# Patient Record
Sex: FEMALE | Race: WHITE | Employment: OTHER | URBAN - METROPOLITAN AREA
[De-identification: names, ages, dates, MRNs, and addresses within clinical notes are randomized per-mention and may not be internally consistent; named-entity substitution may affect disease eponyms.]

---

## 2017-01-18 DIAGNOSIS — M15.9 PRIMARY OSTEOARTHRITIS INVOLVING MULTIPLE JOINTS: ICD-10-CM

## 2017-01-19 RX ORDER — MELOXICAM 15 MG/1
TABLET ORAL
Qty: 30 TABLET | Refills: 3 | Status: ON HOLD | OUTPATIENT
Start: 2017-01-19 | End: 2017-12-13 | Stop reason: HOSPADM

## 2017-03-02 ENCOUNTER — TELEPHONE (OUTPATIENT)
Dept: ORTHOPEDIC SURGERY | Age: 67
End: 2017-03-02

## 2017-03-16 ENCOUNTER — TELEPHONE (OUTPATIENT)
Dept: ENT CLINIC | Age: 67
End: 2017-03-16

## 2017-04-10 ENCOUNTER — NURSE ONLY (OUTPATIENT)
Dept: ENT CLINIC | Age: 67
End: 2017-04-10

## 2017-04-10 DIAGNOSIS — J32.9 SINUSITIS, UNSPECIFIED CHRONICITY, UNSPECIFIED LOCATION: Primary | ICD-10-CM

## 2017-04-10 PROCEDURE — 96372 THER/PROPH/DIAG INJ SC/IM: CPT | Performed by: OTOLARYNGOLOGY

## 2017-06-16 ENCOUNTER — OFFICE VISIT (OUTPATIENT)
Dept: ORTHOPEDIC SURGERY | Age: 67
End: 2017-06-16

## 2017-06-16 VITALS
HEART RATE: 77 BPM | SYSTOLIC BLOOD PRESSURE: 120 MMHG | WEIGHT: 186.95 LBS | HEIGHT: 64 IN | DIASTOLIC BLOOD PRESSURE: 73 MMHG | BODY MASS INDEX: 31.92 KG/M2

## 2017-06-16 DIAGNOSIS — M17.0 PRIMARY OSTEOARTHRITIS OF BOTH KNEES: Primary | ICD-10-CM

## 2017-06-16 PROCEDURE — 99213 OFFICE O/P EST LOW 20 MIN: CPT | Performed by: ORTHOPAEDIC SURGERY

## 2017-06-16 PROCEDURE — 20610 DRAIN/INJ JOINT/BURSA W/O US: CPT | Performed by: ORTHOPAEDIC SURGERY

## 2017-07-11 ENCOUNTER — OFFICE VISIT (OUTPATIENT)
Dept: ORTHOPEDIC SURGERY | Age: 67
End: 2017-07-11

## 2017-07-11 VITALS
WEIGHT: 187 LBS | RESPIRATION RATE: 16 BRPM | HEART RATE: 75 BPM | BODY MASS INDEX: 31.92 KG/M2 | HEIGHT: 64 IN | SYSTOLIC BLOOD PRESSURE: 148 MMHG | DIASTOLIC BLOOD PRESSURE: 81 MMHG

## 2017-07-11 DIAGNOSIS — M25.561 PAIN IN BOTH KNEES, UNSPECIFIED CHRONICITY: Primary | ICD-10-CM

## 2017-07-11 DIAGNOSIS — M17.0 PRIMARY OSTEOARTHRITIS OF BOTH KNEES: ICD-10-CM

## 2017-07-11 DIAGNOSIS — M25.562 PAIN IN BOTH KNEES, UNSPECIFIED CHRONICITY: Primary | ICD-10-CM

## 2017-07-11 PROCEDURE — 73564 X-RAY EXAM KNEE 4 OR MORE: CPT | Performed by: ORTHOPAEDIC SURGERY

## 2017-07-11 PROCEDURE — 99214 OFFICE O/P EST MOD 30 MIN: CPT | Performed by: ORTHOPAEDIC SURGERY

## 2017-07-12 ENCOUNTER — HOSPITAL ENCOUNTER (OUTPATIENT)
Dept: PHYSICAL THERAPY | Age: 67
Discharge: HOME | End: 2017-07-31
Admitting: ORTHOPAEDIC SURGERY

## 2017-07-12 ENCOUNTER — TELEPHONE (OUTPATIENT)
Dept: ORTHOPEDIC SURGERY | Age: 67
End: 2017-07-12

## 2017-07-12 ASSESSMENT — PAIN SCALES - GENERAL: PAINLEVEL_OUTOF10: 9

## 2017-07-13 ENCOUNTER — TELEPHONE (OUTPATIENT)
Dept: ORTHOPEDIC SURGERY | Age: 67
End: 2017-07-13

## 2017-08-14 ENCOUNTER — HOSPITAL ENCOUNTER (OUTPATIENT)
Dept: MRI IMAGING | Age: 67
Discharge: HOME | End: 2017-08-14
Attending: ORTHOPAEDIC SURGERY | Admitting: ORTHOPAEDIC SURGERY

## 2017-08-14 DIAGNOSIS — M17.0 PRIMARY OSTEOARTHRITIS OF BOTH KNEES: ICD-10-CM

## 2017-08-16 ENCOUNTER — HOSPITAL ENCOUNTER (OUTPATIENT)
Dept: OTHER | Age: 67
Discharge: HOME | End: 2017-08-16
Attending: ORTHOPAEDIC SURGERY | Admitting: ORTHOPAEDIC SURGERY

## 2017-08-16 DIAGNOSIS — M17.0 BILATERAL PRIMARY OSTEOARTHRITIS OF KNEE: ICD-10-CM

## 2017-08-18 ENCOUNTER — TELEPHONE (OUTPATIENT)
Dept: ORTHOPEDIC SURGERY | Age: 67
End: 2017-08-18

## 2017-08-24 ENCOUNTER — HOSPITAL ENCOUNTER (OUTPATIENT)
Dept: MAMMOGRAPHY | Age: 67
Discharge: HOME | End: 2017-08-24
Attending: SURGERY | Admitting: SURGERY

## 2017-08-24 DIAGNOSIS — Z12.31 VISIT FOR SCREENING MAMMOGRAM: ICD-10-CM

## 2017-09-01 ENCOUNTER — HOSPITAL ENCOUNTER (OUTPATIENT)
Dept: PREADMISSION TESTING | Age: 67
Discharge: HOME | End: 2017-09-01
Attending: ORTHOPAEDIC SURGERY | Admitting: ORTHOPAEDIC SURGERY

## 2017-09-01 VITALS — BODY MASS INDEX: 31.41 KG/M2 | HEIGHT: 64 IN | WEIGHT: 184 LBS

## 2017-09-01 LAB
A/G RATIO: 1.5 (ref 1.1–2.2)
ABO/RH: NORMAL
ALBUMIN SERPL-MCNC: 4.4 G/DL (ref 3.4–5)
ALP BLD-CCNC: 77 U/L (ref 40–129)
ALT SERPL-CCNC: 17 U/L (ref 10–40)
ANION GAP SERPL CALCULATED.3IONS-SCNC: 12 MMOL/L (ref 3–16)
ANTIBODY SCREEN: NORMAL
APTT: 27.6 SEC (ref 24.1–34.9)
AST SERPL-CCNC: 21 U/L (ref 15–37)
BASOPHILS ABSOLUTE: 0.1 K/UL (ref 0–0.2)
BASOPHILS RELATIVE PERCENT: 1 %
BILIRUB SERPL-MCNC: 0.3 MG/DL (ref 0–1)
BILIRUBIN URINE: NEGATIVE
BLOOD, URINE: NEGATIVE
BUN BLDV-MCNC: 21 MG/DL (ref 7–20)
CALCIUM SERPL-MCNC: 9.3 MG/DL (ref 8.3–10.6)
CHLORIDE BLD-SCNC: 102 MMOL/L (ref 99–110)
CLARITY: ABNORMAL
CO2: 27 MMOL/L (ref 21–32)
COLOR: ABNORMAL
COMMENT UA: NORMAL
CREAT SERPL-MCNC: 0.8 MG/DL (ref 0.6–1.2)
EKG ATRIAL RATE: 82 BPM
EKG DIAGNOSIS: NORMAL
EKG P AXIS: 47 DEGREES
EKG P-R INTERVAL: 148 MS
EKG Q-T INTERVAL: 350 MS
EKG QRS DURATION: 88 MS
EKG QTC CALCULATION (BAZETT): 408 MS
EKG R AXIS: 21 DEGREES
EKG T AXIS: 51 DEGREES
EKG VENTRICULAR RATE: 82 BPM
EOSINOPHILS ABSOLUTE: 0.3 K/UL (ref 0–0.6)
EOSINOPHILS RELATIVE PERCENT: 3.7 %
EPITHELIAL CELLS, UA: 3 /HPF (ref 0–5)
GFR AFRICAN AMERICAN: >60
GFR NON-AFRICAN AMERICAN: >60
GLOBULIN: 3 G/DL
GLUCOSE BLD-MCNC: 164 MG/DL (ref 70–99)
GLUCOSE URINE: NEGATIVE MG/DL
HCT VFR BLD CALC: 40.9 % (ref 36–48)
HEMOGLOBIN: 13.5 G/DL (ref 12–16)
HYALINE CASTS: 5 /LPF (ref 0–8)
INR BLD: 0.91 (ref 0.85–1.15)
KETONES, URINE: NEGATIVE MG/DL
LEUKOCYTE ESTERASE, URINE: NEGATIVE
LYMPHOCYTES ABSOLUTE: 2.3 K/UL (ref 1–5.1)
LYMPHOCYTES RELATIVE PERCENT: 30.5 %
MCH RBC QN AUTO: 30.3 PG (ref 26–34)
MCHC RBC AUTO-ENTMCNC: 33 G/DL (ref 31–36)
MCV RBC AUTO: 92 FL (ref 80–100)
MICROSCOPIC EXAMINATION: YES
MONOCYTES ABSOLUTE: 0.5 K/UL (ref 0–1.3)
MONOCYTES RELATIVE PERCENT: 6.2 %
NEUTROPHILS ABSOLUTE: 4.3 K/UL (ref 1.7–7.7)
NEUTROPHILS RELATIVE PERCENT: 58.6 %
NITRITE, URINE: NEGATIVE
PDW BLD-RTO: 12.8 % (ref 12.4–15.4)
PH UA: 6
PLATELET # BLD: 301 K/UL (ref 135–450)
PMV BLD AUTO: 8.3 FL (ref 5–10.5)
POTASSIUM SERPL-SCNC: 3.8 MMOL/L (ref 3.5–5.1)
PROTEIN UA: ABNORMAL MG/DL
PROTHROMBIN TIME: 10.3 SEC (ref 9.6–13)
RBC # BLD: 4.45 M/UL (ref 4–5.2)
RBC UA: 3 /HPF (ref 0–4)
SODIUM BLD-SCNC: 141 MMOL/L (ref 136–145)
SPECIFIC GRAVITY UA: 1.02
TOTAL PROTEIN: 7.4 G/DL (ref 6.4–8.2)
URINE TYPE: ABNORMAL
UROBILINOGEN, URINE: 0.2 E.U./DL
WBC # BLD: 7.4 K/UL (ref 4–11)
WBC UA: 2 /HPF (ref 0–5)

## 2017-09-01 PROCEDURE — 93010 ELECTROCARDIOGRAM REPORT: CPT | Performed by: INTERNAL MEDICINE

## 2017-09-01 RX ORDER — SODIUM CHLORIDE, SODIUM LACTATE, POTASSIUM CHLORIDE, CALCIUM CHLORIDE 600; 310; 30; 20 MG/100ML; MG/100ML; MG/100ML; MG/100ML
INJECTION, SOLUTION INTRAVENOUS CONTINUOUS
Status: CANCELLED | OUTPATIENT
Start: 2017-09-26

## 2017-09-01 RX ORDER — CEFAZOLIN SODIUM 2 G/100ML
2 INJECTION, SOLUTION INTRAVENOUS ONCE
Status: CANCELLED | OUTPATIENT
Start: 2017-09-26

## 2017-09-01 RX ORDER — LIDOCAINE HYDROCHLORIDE 10 MG/ML
0.5 INJECTION, SOLUTION EPIDURAL; INFILTRATION; INTRACAUDAL; PERINEURAL ONCE
Status: CANCELLED | OUTPATIENT
Start: 2017-09-26

## 2017-09-03 LAB
MRSA CULTURE ONLY: NORMAL
URINE CULTURE, ROUTINE: NORMAL
VITAMIN D 25-HYDROXY: 79.4 NG/ML

## 2017-09-06 ENCOUNTER — TELEPHONE (OUTPATIENT)
Dept: ORTHOPEDIC SURGERY | Age: 67
End: 2017-09-06

## 2017-09-06 RX ORDER — HYDROCODONE BITARTRATE AND ACETAMINOPHEN 5; 325 MG/1; MG/1
1 TABLET ORAL EVERY 6 HOURS PRN
Qty: 28 TABLET | Refills: 0 | Status: SHIPPED | OUTPATIENT
Start: 2017-09-06 | End: 2017-09-13

## 2017-09-26 PROBLEM — M17.12 OSTEOARTHRITIS OF LEFT KNEE: Status: ACTIVE | Noted: 2017-09-26

## 2017-09-28 ENCOUNTER — TELEPHONE (OUTPATIENT)
Dept: ORTHOPEDIC SURGERY | Age: 67
End: 2017-09-28

## 2017-10-02 ENCOUNTER — TELEPHONE (OUTPATIENT)
Dept: ORTHOPEDIC SURGERY | Age: 67
End: 2017-10-02

## 2017-10-09 ENCOUNTER — OFFICE VISIT (OUTPATIENT)
Dept: ORTHOPEDIC SURGERY | Age: 67
End: 2017-10-09

## 2017-10-09 VITALS
HEART RATE: 71 BPM | HEIGHT: 64 IN | BODY MASS INDEX: 31.58 KG/M2 | SYSTOLIC BLOOD PRESSURE: 124 MMHG | DIASTOLIC BLOOD PRESSURE: 64 MMHG | WEIGHT: 185 LBS

## 2017-10-09 DIAGNOSIS — M17.12 PRIMARY OSTEOARTHRITIS OF LEFT KNEE: Primary | ICD-10-CM

## 2017-10-09 PROCEDURE — 73562 X-RAY EXAM OF KNEE 3: CPT | Performed by: ORTHOPAEDIC SURGERY

## 2017-10-09 PROCEDURE — 99024 POSTOP FOLLOW-UP VISIT: CPT | Performed by: ORTHOPAEDIC SURGERY

## 2017-10-16 ENCOUNTER — HOSPITAL ENCOUNTER (OUTPATIENT)
Dept: PHYSICAL THERAPY | Age: 67
Discharge: HOME | End: 2017-10-31
Admitting: ORTHOPAEDIC SURGERY

## 2017-10-16 NOTE — PROGRESS NOTES
Encounter Date: 10/9/2017    Subjective:      Patient ID: Harlan Winkler is a 74 y.o. y.o. female. Chief Complaint   Patient presents with    Post-Op Check     fu for LT TKA, dos 9/26/17. doing well, no pain        Total Knee Follow-up  Harlan Winkler is here for 2 weeks post left total knee arthroplasty follow-up. Pain is controlled with current analgesics. Medication(s) being used: Hydrocodone. She denies  fever, wound drainage, increasing redness, pus, increasing pain, increasing swelling. Post op problems reported:  none    She is  working with home physical therapy and feels ready to transition to outpatient PT. DVT Prophylaxis completed. Exam:  /64   Pulse 71   Ht 5' 4\" (1.626 m)   Wt 185 lb (83.9 kg)   BMI 31.76 kg/m²   Gen: Alert and oriented x3, NAD and appropriately groomed. She is ambulating with her front wheeled walker but feels that she is ready to start transitioning to the cane. left knee incision well approximated, no erythema or drainage. Knee swelling and effusion resolving as expected. Range of motion from 3 to 105. No signs or symptoms of infection or DVT noted on exam.  Bilateral YESENIA stockings in place. X-rays: 3 views left knee Show stable total knee arthroplasty with satisfactory alignment. Assessment:     Stable status post left Total Knee Arthroplasty      Plan:     Orders Placed This Encounter   Procedures    XR KNEE LEFT (3 VIEWS)    [facility]        At this time, she will continue physical therapy and transition to outpatient PT next week. Follow up will be in 3 week(s) no x-rays will be needed unless there is a worsening change in symptoms. She understands and accepts this course of care.

## 2017-10-17 NOTE — FLOWSHEET NOTE
Physical Therapy Daily Treatment Note  Date:  10/17/2017    Patient Name:  Mary Anne Malcolm    :  1950  MRN: [id number]  Restrictions/Precautions:    Medical/Treatment Diagnosis Information:   · Diagnosis: Primary Osteoarthritis of both knees - M17.0 - PREHAB FOR L TKR - NOT SCHEDULED YET  · Treatment Diagnosis: knee pain, knee OA    Tracking Information:  Physician Information Referring Practitioner: Hever Call MD     Plan of Care Sent Date: 17 Signed Received:    Visit Count / Total Visits      Insurance Approved Visits  N/A Approved Dates:     Insurance Information PT Insurance Information: [employer] Medicare PPO - 96/4 bene - Vists per Med Necess     Progress Note/G-codes   [x]  Yes  []  No Next Due: RE-EVAL POST OP     Pain level: 3/10    Subjective:  SEE RE-EVALUATION    Objective:   Observation:   Test measurements:      Exercises:  Exercise/Equipment Resistance/Repetitions Other comments                                                                          Other Therapeutic Activities:  - Patient educated on the focus of skilled physical therapy services and plan of care, including: diagnosis, prognosis, treatment goals & options. Patient was thoroughly educated on this date regarding prehabilitation goals, importance of PT sessions in improving overall ROM, strength and stability prior to surgery, and how prehabilitation will facilitate improved post-operative outcomes. The patient was educated on and instructed in HEP as listed. The patient was given a detailed handout for exercises to initiate in the hospital post-operatively as well as at home. The discharge plan from the hospital was reviewed with the patient; specifically, to reduce length of hospital stay and to minimize time before reinitiating outpatient physical therapy after surgery.  Education regarding early mobility post-operatively in the hospital and emphasis on working with both physical therapy and nursing 50    Treatment/Activity Tolerance:  [x] Patient tolerated treatment well [] Patient limited by fatigue  [] Patient limited by pain  [] Patient limited by other medical complications  [] Other:     Prognosis: [x] Good [] Fair  [] Poor    Patient Requires Follow-up: [x] Yes  [] No    Plan:   [x] Continue per plan of care [] Alter current plan (see comments)  [] Plan of care initiated [] Hold pending MD visit [] Discharge    Plan for Next Session:  Continue Post operative PT    Electronically signed by:  Linda Calzada PT

## 2017-10-17 NOTE — PROGRESS NOTES
Outpatient Physical Therapy    Phone: [phone] Fax: [phone]    Physical Therapy Progress/Discharge Note  Date: 10/17/2017        Patient Name:  Jamaal Cagle    :  1950  MRN: [id number]  Restrictions/Precautions:      Medical/Treatment Diagnosis Information:  · Diagnosis: L TKA performed on 17   · Treatment Diagnosis: Decreased L Knee AROM, Strength and Balance/Proprioception  Insurance/Certification information:  PT Insurance Information: [employer] Medicare PPO - 96/4 bene - Vists per Med Necess  Physician Information:  Referring Practitioner: Shanika Guillory MD  Plan of care signed (Y/N): Y  Visit# / total: 3/10     G-Code (if applicable):      Date G-Code Applied:  10/17/2017  PT G-Codes  Functional Assessment Tool Used: LEFS   Score: 59/80  Functional Limitation: Mobility: Walking and moving around  Mobility: Walking and Moving Around Current Status (): At least 20 percent but less than 40 percent impaired, limited or restricted  Mobility: Walking and Moving Around Goal Status (): 0 percent impaired, limited or restricted     Time Period for Report:  17 - 10/16/17  Cancels/No-shows to date:  0    Plan of Care/Treatment to date:  [x] Therapeutic Exercise      [x] Modalities:  [x] Therapeutic Activity       [] Ultrasound    [x] Gait Training        [] Cervical Traction   [x] Neuromuscular Re-education      [x] Cold/hotpack    [x] Instruction in HEP        [] Lumbar Traction  [x] Manual Therapy        [x] Electrical Stimulation            [] Aquatic Therapy        [] Iontophoresis        ? [] Lymphedema management  [] Women's Health     Other:  [] Vestibular Rehab        []                     ? Significant Findings At Last Visit/Comments:    Subjective:  Subjective  Subjective: pt presents s/p L TKR performed on 17. presents ambulating with SPC. Had a shot of cortizone in her right knee during her left TKR.  Walking some around the house without assistive device. CLOF: States she is doing really well overall.      Objective:   AROM LLE (degrees)  L Knee Flexion 0-145: 100 degrees  L Knee Extension 0: lacking 2 degrees from full extension  AROM RLE (degrees)  R Knee Flexion 0-145: 121 degrees  R Knee Extension 0: 0 degrees          Functional testing Pre hab        Date 7/12/17 Re eval post op   10/16/17 4 week f/u   10/24/17 8 week f/u  Date TBD D/c                  TUG 5.18sec 9.3          30 second sit to stand 23 14          6 minute walk 531.8m 347.7m          Balance             Narrow EDWAR 10 10          Semi tandem 10 10          Tandem  10 10          SLS  6 2             Transfers  Comment: 30 Second Sit to Stand Test: 14 reps   Ambulation  Ambulation?: Yes  Ambulation 1  Comments: 6 Minute Walk Test: 347.7 meters   Balance  Comments: NBOS = 10sec, Staggered Tandem = 10sec, SLS: 2 seconds       Assessment:  Conditions Requiring Skilled Therapeutic Intervention  Body structures, Functions, Activity limitations: Decreased functional mobility , Decreased endurance, Decreased ROM, Decreased balance, Decreased strength  Assessment: Patient presents with increased pain, decreased strength, ROM, Function and will benefit from PT  Treatment Diagnosis: Decreased L Knee AROM, Strength and Balance/Proprioception  Prognosis: Good  Decision Making: Low Complexity  REQUIRES PT FOLLOW UP: Yes    Plan:   Plan  Times per week: 2-3 x   Times per day: Daily  Plan weeks: 4-6 weeks   Current Treatment Recommendations: Strengthening, ROM, Balance Training, Functional Mobility Training, Transfer Training, Gait Training, Stair training, Endurance Training, Safety Education & Training, Home Exercise Program, Patient/Caregiver Education & Training    Progress towards goals:    Long term goals  Time Frame for Long term goals : 8 weeks s/p  Long term goal 1: Patient will ambulate >500m in 6 minutes in order to return to PLOF  Long term goal 2: Patient will squat >60deg 20x w/o sxs/diff

## 2017-10-30 ENCOUNTER — OFFICE VISIT (OUTPATIENT)
Dept: ORTHOPEDIC SURGERY | Age: 67
End: 2017-10-30

## 2017-10-30 ENCOUNTER — TELEPHONE (OUTPATIENT)
Dept: ORTHOPEDIC SURGERY | Age: 67
End: 2017-10-30

## 2017-10-30 VITALS
HEIGHT: 64 IN | SYSTOLIC BLOOD PRESSURE: 126 MMHG | BODY MASS INDEX: 31.58 KG/M2 | HEART RATE: 78 BPM | WEIGHT: 185 LBS | DIASTOLIC BLOOD PRESSURE: 70 MMHG

## 2017-10-30 DIAGNOSIS — M17.11 PRIMARY OSTEOARTHRITIS OF RIGHT KNEE: Primary | ICD-10-CM

## 2017-10-30 DIAGNOSIS — M17.12 PRIMARY OSTEOARTHRITIS OF LEFT KNEE: ICD-10-CM

## 2017-10-30 PROCEDURE — 99024 POSTOP FOLLOW-UP VISIT: CPT | Performed by: ORTHOPAEDIC SURGERY

## 2017-10-30 PROCEDURE — 99213 OFFICE O/P EST LOW 20 MIN: CPT | Performed by: ORTHOPAEDIC SURGERY

## 2017-10-30 NOTE — LETTER
[facility]  Surgery Scheduling Form:  [facility]    DEMOGRAPHICS:                                                                                                              .    Patient Name:  Gianni Kilgore  Patient :  1950   Patient SS#:      Patient Phone:  [phone] (home) [phone] (other) [phone] (cell) Alt. Patient Phone:    Patient Address:  [address] [place], [place] [address]    PCP:  Adam Mora MD  Payor/Plan Subscr  Sex Relation Sub. Ins. ID Effective Group Num   1. Ravi Manjarrez* Lily Reyes 1950 Female Self [address] [id number]                                   [address]     DIAGNOSIS & PROCEDURE:                                                                                            .    Diagnosis:   Right knee osteoarthritis  Operation:  Right Total knee arthroplasty  Location:  [facility]   Provider:  Brenna Novoa. Catalina Canada M.D.    [facility] INFORMATION:                                                                                         .    Requested Date:  17    OR Time: 7:30                       Patient Arrival Time:  6:00  OR Time Required:  90 Minutes  Anesthesia:  General and Femoral Block  Equipment: Ochoa Robles C-Arm:  No   Standard C-Arm:  No  Status: SDA  PAT Required:  Yes  Comments: Allergies: Bupropion; Ezetimibe; Lisinopril; Statins; Welchol  [colesevelam hcl]; Wellbutrin [bupropion hcl];  Codeine; Diclofenac; and Other          10/30/17            BILLING INFORMATION:                                                                                                    .    Procedure:       CPT Code Modifier                      ORTHOPAEDIC SURGERY PRE-SURGICAL PHYSICIAN ORDERS  Gianni Kilgore  1950  Date of Surgery: 17  Right Total knee arthroplasty History & Physical:  [ ] Lakia Lopez   [ ] By Surgeon   By PCP Alonza.[email or web address] ]  Referrals:  [ ] Medical/Cardiac Clearance by _____________________________  [ ] Joint Replacement Class  Alonza.Eisenmenger ] Physical Therapy  [ ] Crutch Walking Instructions   Weight Bearing Status _____________  Alonza.Eisenmenger ] Occupational Therapy  [ ] Smoking Cessation Instructions  [ ] Other ________________________________________________    Pre Admission Testing:  [ ] Hemoglobin & Hematocrit      Alonza.Eisenmenger ] Comprehensive Metabolic Panel  Alonza.Eisenmenger ] CBC with differential              Alonza.Eisenmenger ] Type & Screen  [ ] CBC without differential       [ ] Type & Crossmatch 2 units-if total hip  Alonza.Eisenmenger ] PT/INR                                   [ ] Autologous Blood _____ units  Alonza.Eisenmenger ] PTT                                      Alonza.Eisenmenger ]  EKG  [ ] Urinalysis                               Alonza.Eisenmenger ]  25 Hydroxy Vitamin D  Alonza.Eisenmenger ] Urinalysis with C & S     [X] PT/INR   Alonza.Eisenmenger ] Basic Metabolic Panel         Alonza.Eisenmenger ] MRSA-nasal  Alonza.Eisenmenger  ] Other Anesthesia Guidelines [X] Hemoglobin A1C    Orders to be initiated upon admission to same day surgery:  Alonza.Eisenmenger ] Fasting blood sugar by fingerstick [ ] [place]  Alonza.Eisenmenger ] PT/INR (pt takes Warafin/Coumadin)     [ ] Interscalene Right or Left  Alonza.Eisenmenger ] HCG __X__ Urine _____ Serum              [X] Femoral   Right or Left  [ ] Other ______________________________________________    IV Fluids and Medications:  1. Place IV catherer for IV access. The RN may use 0.1ml of 1% Lidocaine SQ      Per site for IV starts up to maximum of 0.5ml.  2. Infuse Lactated Ringers IV fluid at 50ml per hour. For diabetic or has renal             impaired patient, infuse 0.9% Normal Saline at 50ml/hr. 3. Cefazolin 1g IV if <80kg OR 2g if 80-120kg OR 3g if >120kg, given within one hour of incision time. For those allergic to Cephalosporins, give Clindamycin 600mg IV within 1 hour of incision time.  If the pre-op nasal culture for MRSA/MSSA was positive, add Vancomycin 1 gram IVPB, reduce dose of Vancomycin to 500 mg IVPB if PT < 55 kg or serum creatinine > 2 mg/dl (Vancomycin must be administered over 1 hour).   4. Other medications: Celebrex 400mg pre-op    Additional Orders:  Cristal.Hilda ] Knee high anti-embolism stockings and antithrombic compression pumps (apply in Pre-op)                            Physican Signature:Date: 10/30/2017 Time: 3:04 PM

## 2017-11-01 ENCOUNTER — HOSPITAL ENCOUNTER (OUTPATIENT)
Dept: OTHER | Age: 67
Discharge: HOME | End: 2017-11-30
Attending: ORTHOPAEDIC SURGERY | Admitting: ORTHOPAEDIC SURGERY

## 2017-11-01 NOTE — FLOWSHEET NOTE
Physical Therapy Daily Treatment Note  Date:  2017    Patient Name:  Sonido Chow    :  1950  MRN: [id number]  Restrictions/Precautions:    Medical/Treatment Diagnosis Information:   · Diagnosis: Primary Osteoarthritis of both knees - M17.0 - PREHAB FOR L TKR - sx: 17  · Treatment Diagnosis: knee pain, knee OA    Tracking Information:  Physician Information Referring Practitioner: Eddie Kraft MD     Plan of Care Sent Date: 17 Signed Received: Yes   Visit Count / Total Visits      Insurance Approved Visits  N/A Approved Dates:     Insurance Information PT Insurance Information: [employer] Medicare PPO - 96/4 bene - Vists per Med Necess     Progress Note/G-codes   []  Yes  [x]  No Next Due: #10     Pain level: 0/10    Subjective:  Patient is now 5 weeks post-op. She has scheduled her other knee replacement for . She report she will travel again this weekend, but will be better prepared. Objective:   Observation: 10/30: Pt amb with min limp L LE.  10/25 - patient ambulates without SPC today  Test measurements: 10/30 - lack 2deg ext, 122deg flex     Exercises:  Exercise/Equipment Resistance/Repetitions Other comments        BikeBike 2, seat 6, L1, 5 min    TG Squats       Nu Step      IB/HR /TR     // Bars  Airex DLS tandem balance 2 x 30\" R/L Rockerboard Moguls - R/L 15x ML    SLS 4 x 10'' L    4'' step up/over Fwd/retro - 10x    Cables     Stairs  Up/down 6\" with B rails x4 reciprocal    Hams and knee flex stetch L/R 2x 30 sec ea Pt wants to work on stairs   Mat ex Prone knee flex L 10x  Supine with L heep propped quad set 10x     Ballet [place] 1/2 foam squats 4x  Standing squats 10x                           Other Therapeutic Activities:   10/25 - The patient was functionally tested today for 4 week s/p follow up.   - Patient educated on the focus of skilled physical therapy services and plan of care, including: diagnosis, prognosis, treatment goals & options. Patient was thoroughly educated on this date regarding prehabilitation goals, importance of PT sessions in improving overall ROM, strength and stability prior to surgery, and how prehabilitation will facilitate improved post-operative outcomes. The patient was educated on and instructed in HEP as listed. The patient was given a detailed handout for exercises to initiate in the hospital post-operatively as well as at home. The discharge plan from the hospital was reviewed with the patient; specifically, to reduce length of hospital stay and to minimize time before reinitiating outpatient physical therapy after surgery. Education regarding early mobility post-operatively in the hospital and emphasis on working with both physical therapy and nursing staff to achieve ambulation goal of 150 feet was provided. The patient was highly encouraged to attend joint class in hospital prior to surgery for further instructions on pre and post-surgical care. Also, education regarding goals and expectations was provided; specifically, knee flexion range of motion greater than or equal to 90 degrees and 0 degrees knee extension to promote improved gait mechanics and ambulation. The patient was encouraged to utilize ice/cold pack after surgery to address pain, minimize swelling as often as possible. It is in my medical opinion that this patient is clear from all physical barriers prior to consideration for surgery, activity modifications prior to and post operatively have been discussed with this patient as well as discharge planning and is cleared for surgery from physical therapy perspective.      Functional testing Pre hab        Date 7/12/17 Re eval post op 10/16/17 4 week f/u   Date 10/24 8 week f/u  Date 11/21 D/c            TUG 5.18sec 9.3 7.2     30 second sit to stand 23 14 17     6 minute walk 531.8m 347.7m 448.6m     Balance        Narrow EDWAR 10 10 10     Semi tandem 10 10 10     Tandem  10 10 10     SLS  6 2 10         Home

## 2017-11-02 ENCOUNTER — TELEPHONE (OUTPATIENT)
Dept: ORTHOPEDIC SURGERY | Age: 67
End: 2017-11-02

## 2017-11-03 ENCOUNTER — TELEPHONE (OUTPATIENT)
Dept: ORTHOPEDIC SURGERY | Age: 67
End: 2017-11-03

## 2017-11-03 ENCOUNTER — OFFICE VISIT (OUTPATIENT)
Dept: ORTHOPEDIC SURGERY | Age: 67
End: 2017-11-03

## 2017-11-03 VITALS — BODY MASS INDEX: 31.58 KG/M2 | WEIGHT: 185 LBS | HEIGHT: 64 IN

## 2017-11-03 DIAGNOSIS — M17.12 PRIMARY OSTEOARTHRITIS OF LEFT KNEE: ICD-10-CM

## 2017-11-03 PROCEDURE — L1812 KO ELASTIC W/JOINTS PRE OTS: HCPCS | Performed by: ORTHOPAEDIC SURGERY

## 2017-11-03 PROCEDURE — 99024 POSTOP FOLLOW-UP VISIT: CPT | Performed by: ORTHOPAEDIC SURGERY

## 2017-11-03 PROCEDURE — 73562 X-RAY EXAM OF KNEE 3: CPT | Performed by: ORTHOPAEDIC SURGERY

## 2017-11-04 NOTE — PROGRESS NOTES
Yomi Pan  [id number]  Encounter Date: 10/30/2017  YOB: 1950    Chief Complaint   Patient presents with    Post-Op Check     fu for LT TKA, dos 9/26/17. doing well. History:Ms. Yomi Pan is here in follow up regarding her left TKA and right knee arthritis. She feels she is making good progress with her left knee and is really having minimal pain. Currently rates her pain at 1/10. She is making good progress with physical therapy and would like to begin planning for her right total knee arthroplasty. She has severe osteoarthritic changes in that knee with bone on bone medial compartment and has failed conservative treatment. She is still using her Mobic as well as intermittent use of hydrocodone to control her pain. She ambulates with her cane at times mostly because of her right knee.     Past Medical History:   Diagnosis Date    Alopecia     Arthritis     right knee    Cancer ([facility] [address] 2008    breast(right)  mastectomy only on tamoxifen for 2 years    Degeneration of lumbar intervertebral disc     Depression     GERD (gastroesophageal reflux disease)     Hyperglycemia     Hyperlipidemia     Hyperlipidemia     Hypertension     Insomnia     lumbar spine     herniated disk low back/receives pain injections by Dr. Merrill Ogden Osteoarthritis of knees, bilateral     SBO (small bowel obstruction) 9/2/2015    Sjogren's disease ([facility] [address]      Past Surgical History:   Procedure Laterality Date    BREAST SURGERY  2008    right reconst    CARPAL TUNNEL RELEASE Right 2015    CHOLECYSTECTOMY      COLONOSCOPY      EYE SURGERY Bilateral 2005    lasix    FOOT SURGERY  11/29/11    bone spur removal in the 1st MTP joint and ganglion cyst removal in 2 MTP    HYSTERECTOMY      Complete    KNEE SURGERY Bilateral     meniscal repairs    MASTECTOMY Right     OTHER SURGICAL HISTORY      arm lesion on right arm    REFRACTIVE SURGERY      TONSILLECTOMY      TOTAL KNEE ARTHROPLASTY limited to: Infection, risk to neurovascular structures, stiffness and pain, potential for further surgery, anesthetic misadventure, component failure or dislocation, fracture of the bone, medical complications such as heart attacks, strokes, blood clots and pneumonia all of which could threaten her life or limb. We reviewed discharge destination as outlined below. We also reviewed the demand matching grid and will use high demeand [employer] bearing surface. Journey II system. She will undergo her preadmission testing and continue physical therapy. She will not need to repeat the preoperative joint education class. Unless there are items that we need to address through testing that would delay her surgery, at this point I will see her at the time of surgery and she will follow back. Should additional questions arise prior to surgery, she was asked to call the office for any clarifications that are necessary. RAPT  RISK ASSESSMENT and PREDICTION TOOL    Name: Arash Calderon  YOB: 1950  Surgeon: Dr Lashawn Martin         Value Score    1). What is your age group? 50 - 65 years  = 2      66 - 75 years = 1     > 75 years = 0       Your score = 2   2). Gender? Male = 2     Female = 1       Your score = 1   3). How far on average can you walk? Two blocks or more (+/- rest) = 2    (a block is 200 mvkueh=720 ft)  1 - 2 blocks (+/- rest) = 1     Housebound (most of time) = 0       Your score = 2   4). Which gait aid do you use? None = 2    (more often than not) Single-point stick = 1     Crutches/walker = 0       Your score = 2   5). Do you use community supports? None or one per week = 1    (home help, meals on wheels, district nursing) Two or more per week = 0       Your score = 1   6).  Will you live with someone who can care for you after your operation? yes = 3     no = 0       Your score = 3    Your Total Score (out of 12) = 11       Key: Destination at discharge from acute care predicted by score.  Score < 6  = extended inpatient rehabilitation  Score 6 - 9  = additional intervention to discharge directly home (Rehab in the home)  Score > 9  = directly home      Patient's Preference Prediction Score Agreed destination   [facility]  Date: 10/30/17       She understands and accepts this course of care.

## 2017-11-13 ENCOUNTER — HOSPITAL ENCOUNTER (OUTPATIENT)
Dept: MRI IMAGING | Age: 67
Discharge: HOME | End: 2017-11-13
Admitting: ORTHOPAEDIC SURGERY

## 2017-11-13 DIAGNOSIS — M17.11 PRIMARY OSTEOARTHRITIS OF RIGHT KNEE: ICD-10-CM

## 2017-11-20 ENCOUNTER — TELEPHONE (OUTPATIENT)
Dept: ORTHOPEDIC SURGERY | Age: 67
End: 2017-11-20

## 2017-11-20 RX ORDER — HYDROCODONE BITARTRATE AND ACETAMINOPHEN 5; 325 MG/1; MG/1
TABLET ORAL
Qty: 40 TABLET | Refills: 0 | Status: ON HOLD | OUTPATIENT
Start: 2017-11-20 | End: 2017-12-13 | Stop reason: HOSPADM

## 2017-11-28 RX ORDER — LIDOCAINE HYDROCHLORIDE 10 MG/ML
0.5 INJECTION, SOLUTION EPIDURAL; INFILTRATION; INTRACAUDAL; PERINEURAL ONCE
Status: CANCELLED | OUTPATIENT
Start: 2017-12-12

## 2017-11-28 RX ORDER — SODIUM CHLORIDE, SODIUM LACTATE, POTASSIUM CHLORIDE, CALCIUM CHLORIDE 600; 310; 30; 20 MG/100ML; MG/100ML; MG/100ML; MG/100ML
INJECTION, SOLUTION INTRAVENOUS CONTINUOUS
Status: CANCELLED | OUTPATIENT
Start: 2017-12-12

## 2017-11-29 ENCOUNTER — HOSPITAL ENCOUNTER (OUTPATIENT)
Dept: PREADMISSION TESTING | Age: 67
Discharge: HOME | End: 2017-11-29
Attending: ORTHOPAEDIC SURGERY | Admitting: ORTHOPAEDIC SURGERY

## 2017-11-29 ENCOUNTER — HOSPITAL ENCOUNTER (OUTPATIENT)
Dept: PHYSICAL THERAPY | Age: 67
Discharge: HOME | End: 2017-11-30
Admitting: ORTHOPAEDIC SURGERY

## 2017-11-29 VITALS — BODY MASS INDEX: 31.41 KG/M2 | WEIGHT: 184 LBS | HEIGHT: 64 IN

## 2017-11-29 LAB
A/G RATIO: 1.5 (ref 1.1–2.2)
ABO/RH: NORMAL
ALBUMIN SERPL-MCNC: 4.7 G/DL (ref 3.4–5)
ALP BLD-CCNC: 77 U/L (ref 40–129)
ALT SERPL-CCNC: 27 U/L (ref 10–40)
ANION GAP SERPL CALCULATED.3IONS-SCNC: 11 MMOL/L (ref 3–16)
ANTIBODY SCREEN: NORMAL
APTT: 27.5 SEC (ref 24.1–34.9)
AST SERPL-CCNC: 28 U/L (ref 15–37)
BASOPHILS ABSOLUTE: 0.1 K/UL (ref 0–0.2)
BASOPHILS RELATIVE PERCENT: 1 %
BILIRUB SERPL-MCNC: 0.3 MG/DL (ref 0–1)
BILIRUBIN URINE: NEGATIVE
BLOOD, URINE: NEGATIVE
BUN BLDV-MCNC: 19 MG/DL (ref 7–20)
CALCIUM SERPL-MCNC: 9.5 MG/DL (ref 8.3–10.6)
CHLORIDE BLD-SCNC: 99 MMOL/L (ref 99–110)
CLARITY: ABNORMAL
CO2: 28 MMOL/L (ref 21–32)
COLOR: YELLOW
CREAT SERPL-MCNC: 0.7 MG/DL (ref 0.6–1.2)
EOSINOPHILS ABSOLUTE: 0.2 K/UL (ref 0–0.6)
EOSINOPHILS RELATIVE PERCENT: 4.1 %
EPITHELIAL CELLS, UA: 4 /HPF (ref 0–5)
GFR AFRICAN AMERICAN: >60
GFR NON-AFRICAN AMERICAN: >60
GLOBULIN: 3.1 G/DL
GLUCOSE BLD-MCNC: 134 MG/DL (ref 70–99)
GLUCOSE URINE: NEGATIVE MG/DL
HCT VFR BLD CALC: 39.3 % (ref 36–48)
HEMOGLOBIN: 13.4 G/DL (ref 12–16)
HYALINE CASTS: 9 /LPF (ref 0–8)
INR BLD: 0.93 (ref 0.85–1.15)
KETONES, URINE: NEGATIVE MG/DL
LEUKOCYTE ESTERASE, URINE: NEGATIVE
LYMPHOCYTES ABSOLUTE: 2.1 K/UL (ref 1–5.1)
LYMPHOCYTES RELATIVE PERCENT: 35 %
MCH RBC QN AUTO: 30.4 PG (ref 26–34)
MCHC RBC AUTO-ENTMCNC: 34.1 G/DL (ref 31–36)
MCV RBC AUTO: 89 FL (ref 80–100)
MICROSCOPIC EXAMINATION: YES
MONOCYTES ABSOLUTE: 0.4 K/UL (ref 0–1.3)
MONOCYTES RELATIVE PERCENT: 6.8 %
NEUTROPHILS ABSOLUTE: 3.2 K/UL (ref 1.7–7.7)
NEUTROPHILS RELATIVE PERCENT: 53.1 %
NITRITE, URINE: NEGATIVE
PDW BLD-RTO: 12.5 % (ref 12.4–15.4)
PH UA: 6.5
PLATELET # BLD: 310 K/UL (ref 135–450)
PMV BLD AUTO: 9.2 FL (ref 5–10.5)
POTASSIUM SERPL-SCNC: 3.6 MMOL/L (ref 3.5–5.1)
PROTEIN UA: ABNORMAL MG/DL
PROTHROMBIN TIME: 10.5 SEC (ref 9.6–13)
RBC # BLD: 4.41 M/UL (ref 4–5.2)
RBC UA: 1 /HPF (ref 0–4)
SODIUM BLD-SCNC: 138 MMOL/L (ref 136–145)
SPECIFIC GRAVITY UA: 1.02
TOTAL PROTEIN: 7.8 G/DL (ref 6.4–8.2)
URINE TYPE: ABNORMAL
UROBILINOGEN, URINE: 0.2 E.U./DL
WBC # BLD: 5.9 K/UL (ref 4–11)
WBC UA: 2 /HPF (ref 0–5)

## 2017-11-29 NOTE — PRE-PROCEDURE INSTRUCTIONS
26. Other__________________________________________   *Please call pre admission testing if you any further questions   Diana Douglass [address] [place] [address]. [place]  [phone]   [address] [place] [address]       All above information reviewed with patient in person or by phone. Patient verbalizes understanding. All questions and concerns addressed.                                                                                                  Patient/Rep____________________                                                                                                                                    PRE OP INSTRUCTIONS

## 2017-11-29 NOTE — FLOWSHEET NOTE
treatment goals & options. Patient was thoroughly educated on this date regarding prehabilitation goals, importance of PT sessions in improving overall ROM, strength and stability prior to surgery, and how prehabilitation will facilitate improved post-operative outcomes. The patient was educated on and instructed in HEP as listed. The patient was given a detailed handout for exercises to initiate in the hospital post-operatively as well as at home. The discharge plan from the hospital was reviewed with the patient; specifically, to reduce length of hospital stay and to minimize time before reinitiating outpatient physical therapy after surgery. Education regarding early mobility post-operatively in the hospital and emphasis on working with both physical therapy and nursing staff to achieve ambulation goal of 150 feet was provided. The patient was highly encouraged to attend joint class in hospital prior to surgery for further instructions on pre and post-surgical care. Also, education regarding goals and expectations was provided; specifically, knee flexion range of motion greater than or equal to 90 degrees and 0 degrees knee extension to promote improved gait mechanics and ambulation. The patient was encouraged to utilize ice/cold pack after surgery to address pain, minimize swelling as often as possible. It is in my medical opinion that this patient is clear from all physical barriers prior to consideration for surgery, activity modifications prior to and post operatively have been discussed with this patient as well as discharge planning and is cleared for surgery from physical therapy perspective.      Functional testing Pre hab        Date 7/12/17 Re eval post op 10/16/17 4 week f/u   Date 10/24 8 week f/u  Date 11/21 D/c            TUG 5.18sec 9.3 7.2 6    30 second sit to stand 23 14 17 17    6 minute walk 531.8m 347.7m 448.6m 532.4    Balance        Narrow EDWAR 10 10 10 10    Semi tandem 10 10 10 10    Tandem 10 10 10 10    SLS  6 2 10 10      Home Exercise Program:    11/1 - provided band. Added TKE exercise. 10/30 instructed pt to do quad set with L heel propped to work on L knee ext. 10/18 - The following exercises were performed and added to the patient's home exercise program. (SLR flex/abd, heel slides, heel prop, HSS, knee flexion) Additionally, the patient was educated on appropriate frequency, intensity and duration. Handout provided. The following exercises were performed and added to the patient's home exercise program (SLR, AP, heel slides, heel prop). Additionally, the patient was educated on appropriate frequency, intensity and duration to perform. A detailed handout was provided to the patient. Additionally, a thorough HEP for total knee was provided for more advanced exercises. 10/16/17: The following exercises were performed and added to the pt's home program (educated on appropriate frequency, intensity and duration etc.): ankle pumps, Knee Extension Towel Stretch, Knee Flexion Towel Stretch. Distributed HO. Manual Treatments:  Prone gentle manual stretch L knee, supine with heel propped L knee ext stretches.  STm L distal ITB    Modalities:   11/1 - CP 10' elevated on table  CP x 10'supine  with grey bolster L knee used elastic band to secure CP and covered pt with warm blanket    Timed Code Treatment Minutes: 30    Total Treatment Minutes:  30    Treatment/Activity Tolerance:  [x] Patient tolerated treatment well [] Patient limited by fatigue  [] Patient limited by pain  [] Patient limited by other medical complications  [x] Other: SEE RE-EVAL    Prognosis: [x] Good [] Fair  [] Poor    Patient Requires Follow-up: [x] Yes  [] No    Plan:   [x] Continue per plan of care [] Alter current plan (see comments)  [] Plan of care initiated [] Hold pending MD visit [] Discharge    Plan for Next Session:  RE-EVAL AFTER TKR     Electronically signed by:  Ti Barker PT

## 2017-11-29 NOTE — PROGRESS NOTES
Outpatient Physical Therapy    Phone: [phone] Fax: [phone]    Physical Therapy RE-EVAL Note  Date: 2017        Patient Name:  Annemarie Ross    :  1950  MRN: [id number]  Restrictions/Precautions:      Medical/Treatment Diagnosis Information:  · Diagnosis: L TKA performed on 17; Right Knee OA - M17.11 - TKR R planned: 17  · Treatment Diagnosis: Decreased L Knee AROM, Strength and Balance/Proprioception  Insurance/Certification information:  PT Insurance Information: [employer] Medicare PPO - 96/4 bene - Vists per Med Necess  Physician Information:  Referring Practitioner: Bonnie Abdullahi MD  Plan of care signed (Y/N): Y  Visit# / total visits:    Pain level: 0/10     G-Code (if applicable):      Date G-Code Applied:  2017  PT G-Codes  Functional Assessment Tool Used: LEFS (right leg focus)  Score: 61/80 = 24% dis  Functional Limitation: Mobility: Walking and moving around  Mobility: Walking and Moving Around Current Status (): At least 20 percent but less than 40 percent impaired, limited or restricted  Mobility: Walking and Moving Around Goal Status (): 0 percent impaired, limited or restricted     Time Period for Report: 17 - 17   Cancels/No-shows to date:  0    Plan of Care/Treatment to date:  [x] Therapeutic Exercise      [x] Modalities:  [x] Therapeutic Activity       [] Ultrasound    [x] Gait Training        [] Cervical Traction   [x] Neuromuscular Re-education      [] Cold/hotpack    [x] Instruction in HEP        [] Lumbar Traction  [x] Manual Therapy        [] Electrical Stimulation            [] Aquatic Therapy        [] Iontophoresis        ? [] Lymphedema management  [] Women's Health     Other:  [] Vestibular Rehab        []                     ? Significant Findings At Last Visit/Comments:    Subjective:  Subjective  Subjective: Pt states her L knee continues to do well, only thing she needs to work on is strength.  R knee in preparation for upcoming surgery on 12/12. The patient reports pain increases each week in the right leg.   Pain Screening  Patient Currently in Pain: Denies (at this moment)        Objective:  Social/Functional History  Lives With: Spouse  Type of Home: House  Home Layout: One level  Home Access: Stairs to enter without rails  Entrance Stairs - Number of Steps: 2 STAR  Bathroom Shower/Tub: Walk-in shower  Bathroom Toilet: Handicap height  Bathroom Equipment: Hand-held shower  Bathroom Accessibility: Walker accessible  Home Equipment: Crutches, Rolling walker  Receives Help From: Family  ADL Assistance: Independent  Homemaking Assistance: Independent  Homemaking Responsibilities: Yes  Ambulation Assistance: Independent  Transfer Assistance: Independent  Active : Yes  Occupation: Retired  Leisure & Hobbies: [employer]  Additional Comments: pt reports no recent falls;  can provide 24/7 care at d/c      Observation/Palpation  Posture: Good  Palpation: 1 TTP in right knee medial joint line  Joint Mobility  ROM RLE: 3/6  ROM LLE: 3/6  AROM LLE (degrees)  L Knee Flexion 0-145: 119  L Knee Extension 0: lack 2 deg from full ext  AROM RLE (degrees)  R Knee Flexion 0-145: 121  R Knee Extension 0: 0  Strength RLE  R Hip Flexion: 5/5  R Hip ABduction: 5/5  R Knee Flexion: 5/5  R Knee Extension: 5/5  Strength LLE  L Hip Flexion: 5/5  L Hip ABduction: 5/5  L Knee Flexion: 5/5  L Knee Extension: 5/5  Tone RLE  RLE Tone: Normotonic  Tone LLE  LLE Tone: Normotonic  Motor Control  Gross Motor?: WFL  Additional Measures  Other: 30 second sit to stand: (RLE) 19x  Sensation  Overall Sensation Status: WFL        Ambulation  Ambulation?: Yes  Ambulation 1  Surface: level tile  Device: No Device  Assistance: Independent  Quality of Gait: Normalized gait  Distance: 6 MWT = 532.4 meters  Comments: 6 Minute Walk Test:  532.4 meters  Stairs/Curb  Stairs?: No  Balance  Posture: Good  Sitting - Static: Good  Sitting - Dynamic: Potential: [x] Excellent [] Good [] Fair  [] Poor     Goal Status:  [] Achieved [x] Partially Achieved, NEW GOALS  [] Not Achieved     Patient Status: [x] Continue per initial plan of Care     [] Patient now discharged     [x] Additional visits requested, Please re-certify for additional visits:      Requested frequency/duration:  2X/week for 6 weeks    Electronically signed by:  Reva Sanchez PT    Thank you for this kind and gracious referral for skilled PT services.  I appreciate the opportunity to take part in coordinating and providing care for this individual.     Sincerely,    Renate Loredo PT, DPT          Physician Signature:________________________________Date:__________________  By signing above, therapists plan is approved by physician

## 2017-11-30 LAB
ESTIMATED AVERAGE GLUCOSE: 116.9 MG/DL
HBA1C MFR BLD: 5.7 %
URINE CULTURE, ROUTINE: NORMAL
VITAMIN D 25-HYDROXY: 83.8 NG/ML

## 2017-12-01 ENCOUNTER — HOSPITAL ENCOUNTER (OUTPATIENT)
Dept: OTHER | Age: 67
Discharge: HOME | End: 2017-12-31
Attending: ORTHOPAEDIC SURGERY | Admitting: ORTHOPAEDIC SURGERY

## 2017-12-01 LAB — MRSA CULTURE ONLY: NORMAL

## 2017-12-12 PROBLEM — M17.11 PRIMARY OSTEOARTHRITIS OF RIGHT KNEE: Status: ACTIVE | Noted: 2017-12-12

## 2017-12-26 ENCOUNTER — TELEPHONE (OUTPATIENT)
Dept: ORTHOPEDIC SURGERY | Age: 67
End: 2017-12-26

## 2017-12-26 RX ORDER — HYDROCODONE BITARTRATE AND ACETAMINOPHEN 5; 325 MG/1; MG/1
TABLET ORAL
Qty: 40 TABLET | Refills: 0 | Status: SHIPPED | OUTPATIENT
Start: 2017-12-26 | End: 2018-08-29

## 2017-12-27 ENCOUNTER — OFFICE VISIT (OUTPATIENT)
Dept: ORTHOPEDIC SURGERY | Age: 67
End: 2017-12-27

## 2017-12-27 VITALS
BODY MASS INDEX: 32.27 KG/M2 | DIASTOLIC BLOOD PRESSURE: 80 MMHG | WEIGHT: 189 LBS | HEIGHT: 64 IN | HEART RATE: 85 BPM | SYSTOLIC BLOOD PRESSURE: 131 MMHG

## 2017-12-27 DIAGNOSIS — M17.11 PRIMARY OSTEOARTHRITIS OF RIGHT KNEE: Primary | ICD-10-CM

## 2017-12-27 PROCEDURE — 99024 POSTOP FOLLOW-UP VISIT: CPT | Performed by: ORTHOPAEDIC SURGERY

## 2017-12-27 PROCEDURE — 73560 X-RAY EXAM OF KNEE 1 OR 2: CPT | Performed by: ORTHOPAEDIC SURGERY

## 2017-12-27 RX ORDER — TRAZODONE HYDROCHLORIDE 50 MG/1
50 TABLET ORAL NIGHTLY
COMMUNITY
Start: 2017-12-21

## 2018-01-01 ENCOUNTER — HOSPITAL ENCOUNTER (OUTPATIENT)
Dept: OTHER | Age: 68
Discharge: HOME | End: 2018-01-31
Attending: ORTHOPAEDIC SURGERY | Admitting: ORTHOPAEDIC SURGERY

## 2018-01-17 ENCOUNTER — OFFICE VISIT (OUTPATIENT)
Dept: ORTHOPEDIC SURGERY | Age: 68
End: 2018-01-17

## 2018-01-17 VITALS
SYSTOLIC BLOOD PRESSURE: 136 MMHG | HEIGHT: 64 IN | WEIGHT: 196.2 LBS | HEART RATE: 86 BPM | DIASTOLIC BLOOD PRESSURE: 78 MMHG | BODY MASS INDEX: 33.49 KG/M2

## 2018-01-17 DIAGNOSIS — M17.11 PRIMARY OSTEOARTHRITIS OF RIGHT KNEE: Primary | ICD-10-CM

## 2018-01-17 PROCEDURE — 99024 POSTOP FOLLOW-UP VISIT: CPT | Performed by: ORTHOPAEDIC SURGERY

## 2018-01-17 NOTE — PROGRESS NOTES
total knee arthroplasty for at least 3-6 months postoperatively. She understands and accepts this course of care.

## 2018-01-17 NOTE — LETTER
[facility]  [address]  [address] [facility] [address]  Phone: [phone]  Fax: [phone]    No ref. provider found        January 17, 2018       Patient: Mervin Acuna   MR Number: [id number]   YOB: 1950   Date of Visit: 1/17/2018       Dear Dr. Ham Rubin ref. provider found: Thank you for the request for consultation for Gage Starr to me for the evaluation of right knee replacement. Below are the relevant portions of my assessment and plan of care. If you have questions, please do not hesitate to call me. I look forward to following Dagoberto along with you.     Sincerely,        Daily Gilliam MD    CC providers:  Franc Carballo MD  [address] [place] [address], [address]: [phone]

## 2018-02-01 ENCOUNTER — HOSPITAL ENCOUNTER (OUTPATIENT)
Dept: OTHER | Age: 68
Discharge: HOME | End: 2018-02-28
Attending: ORTHOPAEDIC SURGERY | Admitting: ORTHOPAEDIC SURGERY

## 2018-02-14 ENCOUNTER — OFFICE VISIT (OUTPATIENT)
Dept: ORTHOPEDIC SURGERY | Age: 68
End: 2018-02-14

## 2018-02-14 VITALS
WEIGHT: 202.6 LBS | DIASTOLIC BLOOD PRESSURE: 80 MMHG | SYSTOLIC BLOOD PRESSURE: 165 MMHG | BODY MASS INDEX: 34.59 KG/M2 | HEIGHT: 64 IN | HEART RATE: 74 BPM

## 2018-02-14 DIAGNOSIS — M17.11 PRIMARY OSTEOARTHRITIS OF RIGHT KNEE: Primary | ICD-10-CM

## 2018-02-14 PROCEDURE — 99024 POSTOP FOLLOW-UP VISIT: CPT | Performed by: ORTHOPAEDIC SURGERY

## 2018-02-14 NOTE — PROGRESS NOTES
also joining the [employer] program.  She is advised to continue home exercises and stretching as to improve her quad strength. Follow up will be in 2 month(s). No x-rays will be needed unless there is a worsening change in symptoms. She was reminded about the need for antibiotic prophylaxis before dental cleaning, colonoscopy and other invasive procedures for the first year after joint replacement. She was reminded to be very cautious during inclement weather. She understands and accepts this course of care. During this examination, LUCHO Sam PA-C, functioned as a scribe for Dr. Nathan Crowe. The history taking and physical examination were performed by Dr. Nathan Crowe. The appointment was performed between the patient and Dr. Nathan Crowe. The above encounter was performed by me personally with Natanael Sam PA-C serving solely as a scribe. The above note is an accurate reflection of that encounter and has been reviewed for content by me. Angela Cole, [address] [place] [address] and Sports Medicine  02/14/18  5:08 PM

## 2018-03-01 ENCOUNTER — HOSPITAL ENCOUNTER (OUTPATIENT)
Dept: OTHER | Age: 68
Discharge: HOME | End: 2018-03-31
Attending: ORTHOPAEDIC SURGERY | Admitting: ORTHOPAEDIC SURGERY

## 2018-04-18 ENCOUNTER — OFFICE VISIT (OUTPATIENT)
Dept: ORTHOPEDIC SURGERY | Age: 68
End: 2018-04-18

## 2018-04-18 VITALS
SYSTOLIC BLOOD PRESSURE: 150 MMHG | DIASTOLIC BLOOD PRESSURE: 94 MMHG | BODY MASS INDEX: 34.42 KG/M2 | WEIGHT: 201.6 LBS | HEIGHT: 64 IN | HEART RATE: 74 BPM

## 2018-04-18 DIAGNOSIS — M17.11 PRIMARY OSTEOARTHRITIS OF RIGHT KNEE: Primary | ICD-10-CM

## 2018-04-18 PROCEDURE — 99212 OFFICE O/P EST SF 10 MIN: CPT | Performed by: ORTHOPAEDIC SURGERY

## 2018-04-18 RX ORDER — IBUPROFEN 600 MG/1
600 TABLET ORAL EVERY 8 HOURS PRN
Qty: 90 TABLET | Refills: 0 | Status: SHIPPED | OUTPATIENT
Start: 2018-04-18 | End: 2018-08-29

## 2018-06-13 ENCOUNTER — OFFICE VISIT (OUTPATIENT)
Dept: ORTHOPEDIC SURGERY | Age: 68
End: 2018-06-13

## 2018-06-13 VITALS
HEIGHT: 64 IN | HEART RATE: 60 BPM | SYSTOLIC BLOOD PRESSURE: 161 MMHG | BODY MASS INDEX: 34.31 KG/M2 | WEIGHT: 201 LBS | DIASTOLIC BLOOD PRESSURE: 83 MMHG

## 2018-06-13 DIAGNOSIS — M17.12 PRIMARY OSTEOARTHRITIS OF LEFT KNEE: ICD-10-CM

## 2018-06-13 DIAGNOSIS — M17.11 PRIMARY OSTEOARTHRITIS OF RIGHT KNEE: Primary | ICD-10-CM

## 2018-06-13 DIAGNOSIS — T84.84XA PAIN DUE TO TOTAL KNEE REPLACEMENT, INITIAL ENCOUNTER (HCC): ICD-10-CM

## 2018-06-13 DIAGNOSIS — Z96.659 PAIN DUE TO TOTAL KNEE REPLACEMENT, INITIAL ENCOUNTER (HCC): ICD-10-CM

## 2018-06-13 PROCEDURE — 99213 OFFICE O/P EST LOW 20 MIN: CPT | Performed by: PHYSICIAN ASSISTANT

## 2018-06-14 DIAGNOSIS — T84.84XA PAIN DUE TO TOTAL KNEE REPLACEMENT, INITIAL ENCOUNTER (HCC): ICD-10-CM

## 2018-06-14 DIAGNOSIS — Z96.659 PAIN DUE TO TOTAL KNEE REPLACEMENT, INITIAL ENCOUNTER (HCC): ICD-10-CM

## 2018-06-14 LAB
C-REACTIVE PROTEIN: 6.8 MG/L (ref 0–5.1)
RHEUMATOID FACTOR: <10 IU/ML
SEDIMENTATION RATE, ERYTHROCYTE: 15 MM/HR (ref 0–30)
URIC ACID, SERUM: 4.6 MG/DL (ref 2.6–6)

## 2018-06-15 LAB
ANA INTERPRETATION: NORMAL
ANTI-NUCLEAR ANTIBODY (ANA): NEGATIVE

## 2018-06-25 ENCOUNTER — TELEPHONE (OUTPATIENT)
Dept: ORTHOPEDIC SURGERY | Age: 68
End: 2018-06-25

## 2018-08-29 ENCOUNTER — OFFICE VISIT (OUTPATIENT)
Dept: ENT CLINIC | Age: 68
End: 2018-08-29

## 2018-08-29 VITALS — OXYGEN SATURATION: 98 % | HEART RATE: 76 BPM | DIASTOLIC BLOOD PRESSURE: 70 MMHG | SYSTOLIC BLOOD PRESSURE: 126 MMHG

## 2018-08-29 DIAGNOSIS — J30.89 PERENNIAL ALLERGIC RHINITIS: Primary | ICD-10-CM

## 2018-08-29 PROCEDURE — 99213 OFFICE O/P EST LOW 20 MIN: CPT | Performed by: OTOLARYNGOLOGY

## 2018-08-29 RX ORDER — MELOXICAM 15 MG/1
15 TABLET ORAL DAILY
COMMUNITY

## 2018-08-29 RX ORDER — PANTOPRAZOLE SODIUM 40 MG/1
40 TABLET, DELAYED RELEASE ORAL DAILY
COMMUNITY

## 2018-08-29 NOTE — PROGRESS NOTES
The patient is well known to me for seasonal rhinitis changes. She has not been seen in several years in particular due to her temporary residency in [place]. While visiting here currently, they're notable congestion and drainage with slight itching of the eyes. She has been treated for a cough for the last 2 months in [place] with little relief from antitussives and antibiotics. It is worse while supine and occasionally productive. The mucus is clear and odorless. She had been on Prilosec but stopped this medication given that she did not have heartburn. Current medications include meloxicam although considerably less after both knees were replaced. Desiryl , Norvasc, multivitamins, magnesium. There has been no fever and chills. Review of systems otherwise unremarkable. The patient is a non smoker and is not exposed to second hand smoke or irritants. There have been no known contagious contacts. There are  no other symptoms referable to the upper aerodigestive tract. She has been suspected of possible Sjogren syndrome. She does not want a lip biopsy    GENERAL:   The patient appears well developed and well nourished. The head is normocephalic and atraumatic; no facial scars or weakness are noted. The facial skeleton is normal.The voice has a normal quality and tonality to it. EARS:  The pinnae are normal bilaterally. The ear canals are clear with no cerumen or narrowing. Both eardrums are normal with good aeration of the middle ear space. There is no evidence of attic retraction pocket or cholesteatoma. The umbo and light reflex appear normal.     EYES:   The conjunctivae and lids appear normal. There is full extraocular movement                 JAWS/DENTITION:  There is full ROM of the  TM joints without crepitus either audible or palpable. The dentition is grossly normal, including the gingiva. SALIVARY GLANDS:  The parotid and submandibular glands are normal in appearance. There are no palpable stones or masses. Clear secretions emanate from both Raymundo's and Sammy's ducts. There is no periglandular adenopathy. NASAL:  The external nose including the dorsum, columella, alae, and nasion is unremarkable. The turbinates are 1+ boggy, but respond well to vasoconstriction. The middle and inferior meatuses appeared clear. No polyps, ulceration, or mass are visualized either naris. The nasal septum is midline    NASOPHARYNX:  The mirror exam of the nasopharynx shows no mucosal disease or obstruction. ORAL/PHARYNGEAL:   No abnormal dryness or discrete mucosal lesions are seen at the lips, oral cavity, or oropharynx. There is full tongue mobility, and the fungiform and vallate papillae appear normal. The floor of the mouth is unremarkable. . The palatoglossal and palatopharyngeal folds, uvula, and soft palate do not obstruct the oropharynx. HYPOPHARYNX/LARYNX:  Indirect laryngeal mirror exam shows a normal base of tongue, vallecula, and epiglottis. The aryepiglottic folds appeared normal. No pooling of saliva in the piriform sinuses. The post cricoid space is clear. Normal appearing plica ventricularis and arytenoids. Both cords are mobile on adduction and abduction. NECK:  The neck is supple with full range of motion. The bony and cartilaginous landmarks are normal to palpation. There is no suspicious mass or adenopathy. The thyroid gland is normal to palpation, and the trachea is midline. CHEST/PULMONARY: Effort normal, no stridor. Not tachypneic. No respiratory distress    NEURO: The patient is alert and oriented. The cranial nerves are intact. SKIN: Warm and dry; no pallor    PSYCHIATRIC: Psychiatric: There is a normal mood and affect. Behavior is normal. Judgment and thought content normal.     Impression:  Subacute cough, likely exacerbated by current allergic sensitivities  Habituation as well as contribution from reflux cannot be excluded    Plan:   We had a lengthy discussion regarding strategy is to avoid throat clearing.   Improving hydration  Depo-Medrol 1 mL IM  Nasacort AQ 2 puffs each side  Check back on next return from [place] in 2-3 weeks

## 2018-08-30 ENCOUNTER — HOSPITAL ENCOUNTER (OUTPATIENT)
Dept: MAMMOGRAPHY | Age: 68
Discharge: HOME | End: 2018-08-30
Payer: MEDICARE

## 2018-08-30 DIAGNOSIS — Z12.31 VISIT FOR SCREENING MAMMOGRAM: ICD-10-CM

## 2018-08-30 PROCEDURE — 77063 BREAST TOMOSYNTHESIS BI: CPT

## 2018-09-07 ENCOUNTER — TELEPHONE (OUTPATIENT)
Dept: ENT CLINIC | Age: 68
End: 2018-09-07

## 2018-09-14 ENCOUNTER — OFFICE VISIT (OUTPATIENT)
Dept: ENT CLINIC | Age: 68
End: 2018-09-14

## 2018-09-14 ENCOUNTER — HOSPITAL ENCOUNTER (OUTPATIENT)
Dept: OTHER | Age: 68
Discharge: HOME | End: 2018-09-14
Attending: OTOLARYNGOLOGY | Admitting: OTOLARYNGOLOGY

## 2018-09-14 VITALS — HEART RATE: 72 BPM | SYSTOLIC BLOOD PRESSURE: 110 MMHG | DIASTOLIC BLOOD PRESSURE: 60 MMHG | OXYGEN SATURATION: 95 %

## 2018-09-14 DIAGNOSIS — J45.901 ASTHMA WITH ACUTE EXACERBATION, UNSPECIFIED ASTHMA SEVERITY, UNSPECIFIED WHETHER PERSISTENT: ICD-10-CM

## 2018-09-14 DIAGNOSIS — J45.901 BRONCHITIS, ALLERGIC, UNSPECIFIED ASTHMA SEVERITY, WITH ACUTE EXACERBATION: Primary | ICD-10-CM

## 2018-09-14 PROCEDURE — 99213 OFFICE O/P EST LOW 20 MIN: CPT | Performed by: OTOLARYNGOLOGY

## 2018-09-14 RX ORDER — METHYLPREDNISOLONE 4 MG/1
4 TABLET ORAL SEE ADMIN INSTRUCTIONS
Qty: 1 KIT | Refills: 0 | Status: SHIPPED | OUTPATIENT
Start: 2018-09-14 | End: 2019-01-25 | Stop reason: ALTCHOICE

## 2018-09-14 RX ORDER — PROMETHAZINE HYDROCHLORIDE AND CODEINE PHOSPHATE 6.25; 1 MG/5ML; MG/5ML
5 SYRUP ORAL EVERY 4 HOURS PRN
Qty: 125 ML | Refills: 0 | Status: SHIPPED | OUTPATIENT
Start: 2018-09-14 | End: 2018-09-21

## 2018-09-14 RX ORDER — MONTELUKAST SODIUM 10 MG/1
10 TABLET ORAL NIGHTLY
Qty: 15 TABLET | Refills: 1 | Status: SHIPPED | OUTPATIENT
Start: 2018-09-14 | End: 2022-01-05

## 2018-09-14 RX ORDER — BENZONATATE 100 MG/1
100 CAPSULE ORAL 3 TIMES DAILY PRN
COMMUNITY
End: 2019-01-25 | Stop reason: ALTCHOICE

## 2018-09-18 ENCOUNTER — TELEPHONE (OUTPATIENT)
Dept: ENT CLINIC | Age: 68
End: 2018-09-18

## 2018-09-26 ENCOUNTER — TELEPHONE (OUTPATIENT)
Dept: ENT CLINIC | Age: 68
End: 2018-09-26

## 2018-09-26 NOTE — TELEPHONE ENCOUNTER
Attempted to call patient. Left message on machine that the only thing remains is Allergy testing.  Asked to call and make appointment

## 2019-01-25 ENCOUNTER — OFFICE VISIT (OUTPATIENT)
Dept: ENT CLINIC | Age: 69
End: 2019-01-25
Payer: MEDICARE

## 2019-01-25 VITALS — SYSTOLIC BLOOD PRESSURE: 124 MMHG | DIASTOLIC BLOOD PRESSURE: 72 MMHG | OXYGEN SATURATION: 93 % | HEART RATE: 81 BPM

## 2019-01-25 DIAGNOSIS — J45.901 BRONCHITIS, ALLERGIC, UNSPECIFIED ASTHMA SEVERITY, WITH ACUTE EXACERBATION: Primary | ICD-10-CM

## 2019-01-25 DIAGNOSIS — J32.9 RECURRENT SINUSITIS: ICD-10-CM

## 2019-01-25 DIAGNOSIS — R05.9 COUGH: ICD-10-CM

## 2019-01-25 DIAGNOSIS — E04.1 THYROID NODULE: ICD-10-CM

## 2019-01-25 PROCEDURE — 99214 OFFICE O/P EST MOD 30 MIN: CPT | Performed by: OTOLARYNGOLOGY

## 2019-01-25 RX ORDER — HYDROCODONE POLISTIREX AND CHLORPHENIRAMINE POLISTIREX 10; 8 MG/5ML; MG/5ML
5 SUSPENSION, EXTENDED RELEASE ORAL EVERY 12 HOURS PRN
Qty: 120 ML | Refills: 0 | Status: SHIPPED | OUTPATIENT
Start: 2019-01-25 | End: 2019-02-01

## 2019-01-28 ENCOUNTER — HOSPITAL ENCOUNTER (OUTPATIENT)
Dept: ULTRASOUND IMAGING | Age: 69
Discharge: HOME | End: 2019-01-28
Payer: MEDICARE

## 2019-01-28 ENCOUNTER — HOSPITAL ENCOUNTER (OUTPATIENT)
Dept: CT IMAGING | Age: 69
Discharge: HOME | End: 2019-01-28
Payer: MEDICARE

## 2019-01-28 DIAGNOSIS — R05.9 COUGH: ICD-10-CM

## 2019-01-28 DIAGNOSIS — E04.1 THYROID NODULE: ICD-10-CM

## 2019-01-28 DIAGNOSIS — J32.9 RECURRENT SINUSITIS: ICD-10-CM

## 2019-01-28 PROCEDURE — 70486 CT MAXILLOFACIAL W/O DYE: CPT

## 2019-01-28 PROCEDURE — 76536 US EXAM OF HEAD AND NECK: CPT

## 2019-02-06 ENCOUNTER — TELEPHONE (OUTPATIENT)
Dept: ENT CLINIC | Age: 69
End: 2019-02-06

## 2019-02-24 PROBLEM — R05.9 COUGH: Status: RESOLVED | Noted: 2019-01-25 | Resolved: 2019-02-24

## 2019-03-08 ENCOUNTER — TELEPHONE (OUTPATIENT)
Dept: ENT CLINIC | Age: 69
End: 2019-03-08

## 2019-04-16 ENCOUNTER — TELEPHONE (OUTPATIENT)
Dept: ENT CLINIC | Age: 69
End: 2019-04-16

## 2019-09-12 ENCOUNTER — HOSPITAL ENCOUNTER (OUTPATIENT)
Dept: MAMMOGRAPHY | Age: 69
Discharge: HOME | End: 2019-09-12
Payer: MEDICARE

## 2019-09-12 DIAGNOSIS — R92.8 ABNORMAL MAMMOGRAM: ICD-10-CM

## 2019-09-12 PROCEDURE — 77063 BREAST TOMOSYNTHESIS BI: CPT

## 2020-02-20 ENCOUNTER — TELEPHONE (OUTPATIENT)
Dept: ORTHOPEDIC SURGERY | Age: 70
End: 2020-02-20

## 2020-02-20 NOTE — TELEPHONE ENCOUNTER
Informed pt this was for one year, unless there was an infection which required them used for longer. She understood.

## 2020-08-12 ENCOUNTER — TELEPHONE (OUTPATIENT)
Dept: ENT CLINIC | Age: 70
End: 2020-08-12

## 2020-08-20 ENCOUNTER — OFFICE VISIT (OUTPATIENT)
Dept: ENT CLINIC | Age: 70
End: 2020-08-20
Payer: MEDICARE

## 2020-08-20 VITALS
HEIGHT: 64 IN | DIASTOLIC BLOOD PRESSURE: 90 MMHG | HEART RATE: 99 BPM | WEIGHT: 201 LBS | TEMPERATURE: 97.7 F | BODY MASS INDEX: 34.31 KG/M2 | SYSTOLIC BLOOD PRESSURE: 150 MMHG

## 2020-08-20 PROCEDURE — 99213 OFFICE O/P EST LOW 20 MIN: CPT | Performed by: OTOLARYNGOLOGY

## 2020-08-20 PROCEDURE — 96372 THER/PROPH/DIAG INJ SC/IM: CPT | Performed by: OTOLARYNGOLOGY

## 2020-08-20 RX ORDER — METHYLPREDNISOLONE ACETATE 40 MG/ML
40 INJECTION, SUSPENSION INTRA-ARTICULAR; INTRALESIONAL; INTRAMUSCULAR; SOFT TISSUE ONCE
Status: COMPLETED | OUTPATIENT
Start: 2020-08-20 | End: 2020-08-20

## 2020-08-20 RX ADMIN — METHYLPREDNISOLONE ACETATE 40 MG: 40 INJECTION, SUSPENSION INTRA-ARTICULAR; INTRALESIONAL; INTRAMUSCULAR; SOFT TISSUE at 11:27

## 2020-09-30 ENCOUNTER — OFFICE VISIT (OUTPATIENT)
Dept: ORTHOPEDIC SURGERY | Age: 70
End: 2020-09-30
Payer: MEDICARE

## 2020-09-30 VITALS — TEMPERATURE: 98.1 F | BODY MASS INDEX: 34.31 KG/M2 | WEIGHT: 201 LBS | HEIGHT: 64 IN

## 2020-09-30 PROCEDURE — 99213 OFFICE O/P EST LOW 20 MIN: CPT | Performed by: ORTHOPAEDIC SURGERY

## 2020-09-30 PROCEDURE — 20610 DRAIN/INJ JOINT/BURSA W/O US: CPT | Performed by: ORTHOPAEDIC SURGERY

## 2020-09-30 RX ORDER — BETAMETHASONE SODIUM PHOSPHATE AND BETAMETHASONE ACETATE 3; 3 MG/ML; MG/ML
6 INJECTION, SUSPENSION INTRA-ARTICULAR; INTRALESIONAL; INTRAMUSCULAR; SOFT TISSUE ONCE
Status: COMPLETED | OUTPATIENT
Start: 2020-09-30 | End: 2020-09-30

## 2020-09-30 RX ORDER — LIDOCAINE HYDROCHLORIDE 10 MG/ML
3 INJECTION, SOLUTION INFILTRATION; PERINEURAL ONCE
Status: COMPLETED | OUTPATIENT
Start: 2020-09-30 | End: 2020-09-30

## 2020-09-30 RX ADMIN — BETAMETHASONE SODIUM PHOSPHATE AND BETAMETHASONE ACETATE 6 MG: 3; 3 INJECTION, SUSPENSION INTRA-ARTICULAR; INTRALESIONAL; INTRAMUSCULAR; SOFT TISSUE at 14:09

## 2020-09-30 RX ADMIN — LIDOCAINE HYDROCHLORIDE 3 ML: 10 INJECTION, SOLUTION INFILTRATION; PERINEURAL at 14:10

## 2020-09-30 NOTE — PROGRESS NOTES
Date of Service: 9/30/2020  Chief Complaint    Left Hip Pain (new problem, LT hip pain for one year, on and off. NKI. pain also on Low back.)      History of Present Illness:  Fang Epstein is a 78 y.o. female here for evaluation of intermittent lateral left hip pain and low back pain. She reports a history of previous lumbar epidural injections at [facility] several years ago. No recent falls or direct trauma. Pain is intermittent but can be quite sharp at times. No catching or locking symptoms in her hip. She underwent previous bilateral total knee arthroplasties without worsening symptoms. Pain does not keep her awake at night. She denies numbness or tingling that radiates down the legs. No change in her bowel or bladder control. Current symptoms when they are at their worst are as described below and reviewed these findings with her today.     Pain Assessment  Location of Pain: Pelvis  Location Modifiers: Left  Severity of Pain: 7  Quality of Pain: Aching, Dull  Duration of Pain: A few hours  Frequency of Pain: Intermittent  Aggravating Factors: Walking, Standing  Limiting Behavior: Some  Relieving Factors: Rest  Result of Injury: No  Work-Related Injury: No  Are there other pain locations you wish to document?: No    Medical History:  Current Outpatient Medications   Medication Sig Dispense Refill    montelukast (SINGULAIR) 10 MG tablet Take 1 tablet by mouth nightly 15 tablet 1    pantoprazole (PROTONIX) 40 MG tablet Take 40 mg by mouth daily      meloxicam (MOBIC) 15 MG tablet Take 15 mg by mouth daily      traZODone (DESYREL) 50 MG tablet Take 50 mg by mouth nightly      Red Yeast Rice Extract (RED YEAST RICE PO) Take by mouth      Multiple Vitamins-Minerals (HAIR/SKIN/NAILS PO) Take by mouth      losartan (COZAAR) 100 MG tablet       Multiple Vitamins-Minerals (ZINC) LOZG Take by mouth daily  0    Zinc 25 MG TABS Take 0.5 tablets by mouth daily      Biotin 1 MG CAPS Take 1 tablet by mouth daily      amLODIPine (NORVASC) 5 MG tablet Take 5 mg by mouth daily.  Doxylamine Succinate, Sleep, (SLEEP AID PO) Take 1 capsule by mouth nightly       Ascorbic Acid (VITAMIN C) 500 MG tablet Take 500 mg by mouth daily.  Calcium Carbonate-Vitamin D (CALCIUM + D PO) Take 1 tablet by mouth daily       vitamin D (CHOLECALCIFEROL) 400 UNIT TABS tablet Take 1,000 Units by mouth daily       Magnesium 500 MG TABS Take 1 tablet by mouth daily       FISH OIL Take 1 tablet by mouth daily        No current facility-administered medications for this visit. Past Medical History:   Diagnosis Date    Alopecia     Arthritis     right knee    Cancer ([facility] [address] 2008    breast(right)  mastectomy only on tamoxifen for 2 years    Degeneration of lumbar intervertebral disc     Depression     GERD (gastroesophageal reflux disease)     Hyperglycemia     Hyperlipidemia     Hyperlipidemia     Hypertension     Insomnia     lumbar spine     herniated disk low back/receives pain injections by Dr. Mercedes Maldonado Osteoarthritis of knees, bilateral     SBO (small bowel obstruction) ([facility] [address] 9/2/2015    Sjogren's disease ([facility] [address]      Past Surgical History:   Procedure Laterality Date    BREAST SURGERY  2008    right reconst    CARPAL TUNNEL RELEASE Right 2015    CHOLECYSTECTOMY      COLONOSCOPY      EYE SURGERY Bilateral 2005    lasix    FOOT SURGERY  11/29/11    bone spur removal in the 1st MTP joint and ganglion cyst removal in 2 MTP    HYSTERECTOMY      Complete    KNEE SURGERY Bilateral     meniscal repairs    MASTECTOMY Right     OTHER SURGICAL HISTORY      arm lesion on right arm    REFRACTIVE SURGERY      TONSILLECTOMY      TOTAL KNEE ARTHROPLASTY Left 09/26/2017    left total knee, right knee cortisone injection    TOTAL KNEE ARTHROPLASTY Right 12/12/2017    with Dr. Melisa Dexter at [facility]     allergies, social and family histories were reviewed and updated as appropriate.     Review of on for lumbar epidural/facet injections. I discussed the option of cortisone injection and its associated risks and benefits. Patient agreed to receive left trochanteric bursa cortisone injection in the clinic today. I informed patient that the potential to improve pain and function varies in response amongst patients. The area over the left trochanteric bursa was prepped with betadine and 1 mL of betamethasone mixed with 3 mL 1% lidocaine plain were instilled with careful aspiration and injection under aseptic technique. The skin was again cleaned with alcohol and covered with a sterile adhesive bandage over the entry site. Patient tolerated the injection well and was instructed to call back if her symptoms return. Questions were encouraged and answered to the best of my ability and with patient satisfaction. A hand out of home exercises for trochanteric bursitis was provided to her today. She understands and accepts this course of care. Documentation was done using voice recognition dragon software. Every effort was made to ensure accuracy; however, inadvertent  Unintentional computerized transcription errors may be present.

## 2020-10-01 ENCOUNTER — HOSPITAL ENCOUNTER (OUTPATIENT)
Dept: MAMMOGRAPHY | Age: 70
Discharge: HOME | End: 2020-10-01
Payer: MEDICARE

## 2020-10-01 PROCEDURE — 77063 BREAST TOMOSYNTHESIS BI: CPT

## 2021-01-06 ENCOUNTER — OFFICE VISIT (OUTPATIENT)
Dept: ORTHOPEDIC SURGERY | Age: 71
End: 2021-01-06
Payer: MEDICARE

## 2021-01-06 VITALS — HEIGHT: 64 IN | BODY MASS INDEX: 36.7 KG/M2 | TEMPERATURE: 97.2 F | WEIGHT: 215 LBS

## 2021-01-06 DIAGNOSIS — M70.62 GREATER TROCHANTERIC BURSITIS OF LEFT HIP: ICD-10-CM

## 2021-01-06 DIAGNOSIS — M76.892 HIP TENDONITIS, LEFT: Primary | ICD-10-CM

## 2021-01-06 DIAGNOSIS — M54.16 LUMBAR RADICULAR PAIN: ICD-10-CM

## 2021-01-06 PROCEDURE — 20610 DRAIN/INJ JOINT/BURSA W/O US: CPT | Performed by: ORTHOPAEDIC SURGERY

## 2021-01-06 PROCEDURE — 99213 OFFICE O/P EST LOW 20 MIN: CPT | Performed by: ORTHOPAEDIC SURGERY

## 2021-01-06 RX ORDER — LIDOCAINE HYDROCHLORIDE 10 MG/ML
3 INJECTION, SOLUTION INFILTRATION; PERINEURAL ONCE
Status: COMPLETED | OUTPATIENT
Start: 2021-01-06 | End: 2021-01-06

## 2021-01-06 RX ORDER — BETAMETHASONE SODIUM PHOSPHATE AND BETAMETHASONE ACETATE 3; 3 MG/ML; MG/ML
6 INJECTION, SUSPENSION INTRA-ARTICULAR; INTRALESIONAL; INTRAMUSCULAR; SOFT TISSUE ONCE
Status: COMPLETED | OUTPATIENT
Start: 2021-01-06 | End: 2021-01-06

## 2021-01-06 RX ADMIN — BETAMETHASONE SODIUM PHOSPHATE AND BETAMETHASONE ACETATE 6 MG: 3; 3 INJECTION, SUSPENSION INTRA-ARTICULAR; INTRALESIONAL; INTRAMUSCULAR; SOFT TISSUE at 08:39

## 2021-01-06 RX ADMIN — LIDOCAINE HYDROCHLORIDE 3 ML: 10 INJECTION, SOLUTION INFILTRATION; PERINEURAL at 08:39

## 2021-01-06 NOTE — PROGRESS NOTES
Jumana Cole MD  [address]  [address]  [address] [place] [address]    Kym Lamar  [id number]  Encounter Date: 1/6/2021  YOB: 1950    Chief Complaint   Patient presents with    Follow-up     Left hip tendonitis. Cortisone injection; 9/30/20. History:Ms. Kym Lamar is here in follow up regarding her left hip tendonitis. On 9/30/2020, she received a cortisone injection which offered her relief for approximately 2 months. However her pain has been progressing. She feels pain now radiating up into her back and has continued around to the left side of her hip. She has an appointment with her primary care physician in [place] in a month. She still prefers to come to [facility] for any invasive procedures. She has remote history of lumbar epidurals. She denies any loss of motor function or numbness/tingling down the leg. No change in bowel or bladder control. He does feel like her symptoms are worse than they were she had her injection in September. Exam:  Temp 97.2 °F (36.2 °C) (Infrared)   Ht 5' 4\" (1.626 m)   Wt 215 lb (97.5 kg)   BMI 36.90 kg/m²   General: Alert and oriented x3, No acute distress, Cooperative and conversant. Obesity Present  Mood and affect are appropriate. Left Hip :    Inspection:  Normal muscle contours and no significant limb length discrepancy. No gross atrophy in any particular myotome. Palpation: Moderate tenderness to the greater trochanter/trochanteric bursa. Moderate tenderness to the sacroiliac joint and along the left paraspinous muscular region. No tenderness to the knee joint. Functional range of motion of the left hip when nonweightbearing with mild lateral pain and with gross motor strength intact throughout her left lower extremity. Additional comments: Sensation to light touch grossly present and capillary refill less than 2 seconds.     Skin: There are no rashes, ulcerations or lesions. Gait: Decreased stride length and weight shift, favoring her left side. Assessment:   Diagnosis Orders   1. Hip tendonitis, left     2. Lumbar radicular pain         Orders  No orders of the defined types were placed in this encounter. Plan:  I have discussed treatment options with the patient. After her examination and description of how her pain has now increased, I believe some of her pain is due to her lumbar spine as well. Will order a MRI of the lumbar spine . Discussed that she can take the order with her to [place] and have it completed there, she will then need to have the MRI results and a CD of the images mailed to us. She will then need to proceed with interventional pain doctor to proceed with injections, and would prefer to schedule this within the [facility]. For her current her trochanteric bursa/abductor tendinitis left hip pain we will repeat her hip injection today. She will follow up as needed. I reviewed the option of cortisone injection and its associated risks and benefits. Patient agreed to receive left trochanteric bursa cortisone injection in the clinic today. I informed patient that the potential to improve pain and function varies in response amongst patients. The area over the left trochanteric bursa was prepped with betadine and 1 mL of betamethasone mixed with 3 mL 1% lidocaine plain were instilled with careful aspiration and injection under aseptic technique. The skin was again cleaned with alcohol and covered with a sterile adhesive bandage over the entry site. Patient tolerated the injection well. Questions were encouraged and answered to the best of my ability and with patient satisfaction. Plan she will send those results and images of her lumbar MRI once it is complete the appropriate referral to Dr. Katja Lino for consideration of interventional injections.     She understands and accepts this course of care.    By signing my name below, Melina Wray, attest that this documentation has been prepared under the direction and in the presence of Maxine Lyons MD.   Electronically Signed: Baldev Mckeon, 1/6/21, 8:17 AM EST. Documentation was done using voice recognition dragon software. Every effort was made to ensure accuracy; however, inadvertent  Unintentional computerized transcription errors may be present. Raissa Mota MD, personally performed the services described in this documentation. All medical record entries made by the baldev were at my direction and in my presence. I have reviewed the chart and discharge instructions (if applicable) and agree that the record reflects my personal performance and is accurate and complete. Maxine Lyons MD, 1/6/21, 9:39 AM EST.

## 2021-01-07 ENCOUNTER — TELEPHONE (OUTPATIENT)
Dept: ORTHOPEDIC SURGERY | Age: 71
End: 2021-01-07

## 2021-01-07 NOTE — TELEPHONE ENCOUNTER
Hayward Schilder from [place], [phone], needs Clinical Notes sent over on patient. He has [employer] and his insurance was not pre-certed so the MRI has to be cancelled today.

## 2021-01-08 NOTE — TELEPHONE ENCOUNTER
We could not do precert due to [employer]can already starting it. Called [employer] to let them know and faxed the OV note to them for the precert.

## 2021-01-08 NOTE — TELEPHONE ENCOUNTER
Called [employer] and let them know this was not precerted due to patient informing us she was going to have this done in [place], but we will try to do this before her appt at 3pm today.

## 2021-02-02 ENCOUNTER — TELEPHONE (OUTPATIENT)
Dept: ORTHOPEDIC SURGERY | Age: 71
End: 2021-02-02

## 2021-02-11 ENCOUNTER — TELEPHONE (OUTPATIENT)
Dept: ORTHOPEDIC SURGERY | Age: 71
End: 2021-02-11

## 2021-02-11 DIAGNOSIS — M54.16 LUMBAR RADICULAR PAIN: ICD-10-CM

## 2021-02-11 DIAGNOSIS — M76.892 HIP TENDONITIS, LEFT: Primary | ICD-10-CM

## 2021-02-11 NOTE — TELEPHONE ENCOUNTER
General Question     Subject: THERAPHY INFO  Patient and /or Facility Request:Dagoberto Martin   Contact [phone]:

## 2021-07-02 ENCOUNTER — TELEPHONE (OUTPATIENT)
Dept: ORTHOPEDIC SURGERY | Age: 71
End: 2021-07-02

## 2021-07-02 DIAGNOSIS — M54.16 LUMBAR RADICULAR PAIN: Primary | ICD-10-CM

## 2021-07-02 NOTE — TELEPHONE ENCOUNTER
General Question     Subject: READY TO SCHEDULE MRI  Patient and /or Facility Request: Leigh Ann Dennis  Contact Number: [phone]    PATIENT HAS COMPLETED HER PHYSICAL THERAPY IN April     PATIENT WOULD LIKE TO KNOW WHAT'S NEXT.     PLEASE CALL BACK PATIENT AT THE ABOVE NUMBER

## 2021-07-02 NOTE — TELEPHONE ENCOUNTER
Called and informed pt that we could reorder the MRI due to her completing the PT. Scheduled her a f/u visit w/CHINO on 7/230/21 to review results, due to her being in town that week. I informed her that once we hear from the insurance we will call to let her know if she can proceed with scheduling at [facility], hopefully earlier in the week.

## 2021-07-12 ENCOUNTER — TELEPHONE (OUTPATIENT)
Dept: ORTHOPEDIC SURGERY | Age: 71
End: 2021-07-12

## 2021-07-12 NOTE — TELEPHONE ENCOUNTER
LVM letting patient know MRI is authorized and gave number to call and schedule. Also advised to call us back to schedule FU with CHINO for results.

## 2021-07-27 ENCOUNTER — TELEPHONE (OUTPATIENT)
Dept: ORTHOPEDIC SURGERY | Age: 71
End: 2021-07-27

## 2021-07-27 ENCOUNTER — HOSPITAL ENCOUNTER (OUTPATIENT)
Dept: MRI IMAGING | Age: 71
Discharge: HOME | End: 2021-07-27
Payer: MEDICARE

## 2021-07-27 DIAGNOSIS — M54.16 LUMBAR RADICULAR PAIN: ICD-10-CM

## 2021-07-27 DIAGNOSIS — M48.061 SPINAL STENOSIS OF LUMBAR REGION, UNSPECIFIED WHETHER NEUROGENIC CLAUDICATION PRESENT: ICD-10-CM

## 2021-07-27 DIAGNOSIS — M54.16 LUMBAR RADICULAR PAIN: Primary | ICD-10-CM

## 2021-07-27 PROCEDURE — 72148 MRI LUMBAR SPINE W/O DYE: CPT

## 2021-07-27 NOTE — TELEPHONE ENCOUNTER
LVM for patient asking that she call office. Per JDA, MRI showed moderate to severe spinal stenosis, recommending referral to Dr Aron Crews at [address] [place] [address].

## 2021-10-05 ENCOUNTER — OFFICE VISIT (OUTPATIENT)
Dept: ORTHOPEDIC SURGERY | Age: 71
End: 2021-10-05
Payer: MEDICARE

## 2021-10-05 VITALS — WEIGHT: 217.8 LBS | BODY MASS INDEX: 37.18 KG/M2 | HEIGHT: 64 IN

## 2021-10-05 DIAGNOSIS — M25.572 LEFT ANKLE PAIN, UNSPECIFIED CHRONICITY: ICD-10-CM

## 2021-10-05 DIAGNOSIS — R22.42 ANKLE MASS, LEFT: Primary | ICD-10-CM

## 2021-10-05 PROCEDURE — 99214 OFFICE O/P EST MOD 30 MIN: CPT | Performed by: ORTHOPAEDIC SURGERY

## 2021-10-05 NOTE — PROGRESS NOTES
CHIEF COMPLAINT: Left lateral foot and ankle pain/ mass    HISTORY:  Ms. Tyler Tripp 73 y.o.  female presents today for the first visit for evaluation of left lateral foot and ankle pain with mass which started to worsen and enlarge over the past 2 years.  She is complaining of achy, mild persistent pain. Pain is increase with  shoe wear. Pain is achy with first few steps, dull achy pain by the end of the day. She rates her pain a 47/10 VAS. Pain is intermittent. No radiation and no numbness and tingling sensation. No other complaint. Denies smoking.   She is known to our office and sees Dr. Pema Acevedo for left hip tendinitis, most recent seen on 1/6/2021    Past Medical History:   Diagnosis Date    Alopecia     Arthritis     right knee    Cancer ([address] 2008    breast(right)  mastectomy only on tamoxifen for 2 years    Degeneration of lumbar intervertebral disc     Depression     GERD (gastroesophageal reflux disease)     Hyperglycemia     Hyperlipidemia     Hyperlipidemia     Hypertension     Insomnia     lumbar spine     herniated disk low back/receives pain injections by Dr. Thomas Hanks Osteoarthritis of knees, bilateral     SBO (small bowel obstruction) ([address] 9/2/2015    Sjogren's disease ([address]        Past Surgical History:   Procedure Laterality Date    BREAST SURGERY  2008    right reconst    CARPAL TUNNEL RELEASE Right 2015    CHOLECYSTECTOMY      COLONOSCOPY      EYE SURGERY Bilateral 2005    lasix    FOOT SURGERY  11/29/11    bone spur removal in the 1st MTP joint and ganglion cyst removal in 2 MTP    HYSTERECTOMY      Complete    KNEE SURGERY Bilateral     meniscal repairs    MASTECTOMY Right     OTHER SURGICAL HISTORY      arm lesion on right arm    REFRACTIVE SURGERY      TONSILLECTOMY      TOTAL KNEE ARTHROPLASTY Left 09/26/2017    left total knee, right knee cortisone injection    TOTAL KNEE ARTHROPLASTY Right 12/12/2017    with Dr. Pema Acevedo at [address] [place] [address] History     Socioeconomic History    Marital status:      Spouse name: Not on file    Number of children: 1    Years of education: Not on file    Highest education level: Not on file   Occupational History    Occupation: retired teacher   Tobacco Use    Smoking status: Never Smoker    Smokeless tobacco: Never Used   Vaping Use    Vaping Use: Never used   Substance and Sexual Activity    Alcohol use: Yes     Alcohol/week: 7.0 standard drinks     Types: 7 Glasses of wine per week     Comment: 1 glass of wine nightly    Drug use: No    Sexual activity: Not on file   Other Topics Concern    Not on file   Social History Narrative    Not on file     Social Determinants of Health     Financial Resource Strain:     Difficulty of Paying Living Expenses:    Food Insecurity:     Worried About Running Out of Food in the Last Year:     Ran Out of Food in the Last Year:    Transportation Needs:     Lack of Transportation (Medical):      Lack of Transportation (Non-Medical):    Physical Activity:     Days of Exercise per Week:     Minutes of Exercise per Session:    Stress:     Feeling of Stress :    Social Connections:     Frequency of Communication with Friends and Family:     Frequency of Social Gatherings with Friends and Family:     Attends Yazdanism Services:     Active Member of Clubs or Organizations:     Attends Club or Organization Meetings:     Marital Status:    Intimate Partner Violence:     Fear of Current or Ex-Partner:     Emotionally Abused:     Physically Abused:     Sexually Abused:        Family History   Problem Relation Age of Onset    Heart Disease Mother     Heart Disease Father     Diabetes Father        Current Outpatient Medications on File Prior to Visit   Medication Sig Dispense Refill    montelukast (SINGULAIR) 10 MG tablet Take 1 tablet by mouth nightly 15 tablet 1    pantoprazole (PROTONIX) 40 MG tablet Take 40 mg by mouth daily      meloxicam (MOBIC) 15 MG tablet Take 15 mg by mouth daily      traZODone (DESYREL) 50 MG tablet Take 50 mg by mouth nightly      Red Yeast Rice Extract (RED YEAST RICE PO) Take by mouth      Multiple Vitamins-Minerals (HAIR/SKIN/NAILS PO) Take by mouth      losartan (COZAAR) 100 MG tablet       Multiple Vitamins-Minerals (ZINC) LOZG Take by mouth daily  0    Zinc 25 MG TABS Take 0.5 tablets by mouth daily      Biotin 1 MG CAPS Take 1 tablet by mouth daily      amLODIPine (NORVASC) 5 MG tablet Take 5 mg by mouth daily.  Doxylamine Succinate, Sleep, (SLEEP AID PO) Take 1 capsule by mouth nightly       Ascorbic Acid (VITAMIN C) 500 MG tablet Take 500 mg by mouth daily.  Calcium Carbonate-Vitamin D (CALCIUM + D PO) Take 1 tablet by mouth daily       vitamin D (CHOLECALCIFEROL) 400 UNIT TABS tablet Take 1,000 Units by mouth daily       Magnesium 500 MG TABS Take 1 tablet by mouth daily       FISH OIL Take 1 tablet by mouth daily        No current facility-administered medications on file prior to visit. Pertinent items are noted in HPI  Review of systems reviewed from Patient History Form dated on 10/5/2021 and available in the patient's chart under the Media tab. No change noted. PHYSICAL EXAMINATION:  Ms. Lazaro Shelley is a very pleasant 73 y.o.  female who presents today in no acute distress, awake, alert, and oriented. She is well dressed, nourished and  groomed. Patient with normal affect. Height is  5' 4\" (1.626 m), weight is 217 lb 12.8 oz (98.8 kg), Body mass index is 37.39 kg/m². Resting respiratory rate is 16. Examination of the gait, showed that the patient walks heel-toe with minimal limp.  Examination of both ankles showing dorsiflexion to about 10 degrees bilaterally, which increased with knee flexion.  She has intact sensation and good pedal pulses.  She has good strength in all four planes, including eversion, and has moderate tenderness on deep palpation over the left lateral foot and ankle, with prominent swelling/mass compared to the other side.  The ankles are stable to drawer test bilaterally, equally.            IMAGING:  Xray's were reviewed.  3 views of the left ankle taken in office today, and showed no acute fracture. There is prominent soft tissue swelling left lateral ankle. IMPRESSION: Left lateral foot and ankle pain/mass. PLAN:  I discussed with the patient the treatment options including both surgical and non-surgical treatment, and that my recommendation would be surgical excision given the amount of symptoms. I discussed the risks and benefits of surgery with the patient, including but not limited to infection, bleeding, pain, injury to nerves or blood vessels failure of the surgery and need for additional surgery. All the patient's questions were answered. We discussed an expected post-operative course. She  is understanding of this and wishes to proceed. Recommend getting an MRI to further evaluate the mass. Since she lives in [place], we will call her the results and discuss further plan.        Mikala Cowan MD

## 2021-10-06 PROBLEM — R22.42 ANKLE MASS, LEFT: Status: ACTIVE | Noted: 2021-10-06

## 2021-10-07 ENCOUNTER — HOSPITAL ENCOUNTER (OUTPATIENT)
Dept: MAMMOGRAPHY | Age: 71
Discharge: HOME | End: 2021-10-07
Payer: MEDICARE

## 2021-10-07 VITALS — HEIGHT: 64 IN | BODY MASS INDEX: 35.85 KG/M2 | WEIGHT: 210 LBS

## 2021-10-07 DIAGNOSIS — Z12.31 VISIT FOR SCREENING MAMMOGRAM: ICD-10-CM

## 2021-10-07 PROCEDURE — 77063 BREAST TOMOSYNTHESIS BI: CPT

## 2021-10-12 ENCOUNTER — TELEPHONE (OUTPATIENT)
Dept: ORTHOPEDIC SURGERY | Age: 71
End: 2021-10-12

## 2021-11-10 ENCOUNTER — TELEPHONE (OUTPATIENT)
Dept: ORTHOPEDIC SURGERY | Age: 71
End: 2021-11-10

## 2021-11-10 ENCOUNTER — HOSPITAL ENCOUNTER (OUTPATIENT)
Dept: MRI IMAGING | Age: 71
Discharge: HOME | End: 2021-11-10
Payer: MEDICARE

## 2021-11-10 DIAGNOSIS — M25.572 LEFT ANKLE PAIN, UNSPECIFIED CHRONICITY: ICD-10-CM

## 2021-11-10 DIAGNOSIS — M25.572 LEFT ANKLE PAIN, UNSPECIFIED CHRONICITY: Primary | ICD-10-CM

## 2021-11-10 LAB
A/G RATIO: 1.2 (ref 1.1–2.2)
ALBUMIN SERPL-MCNC: 4 G/DL (ref 3.4–5)
ALP BLD-CCNC: 86 U/L (ref 40–129)
ALT SERPL-CCNC: 25 U/L (ref 10–40)
ANION GAP SERPL CALCULATED.3IONS-SCNC: 13 MMOL/L (ref 3–16)
AST SERPL-CCNC: 20 U/L (ref 15–37)
BILIRUB SERPL-MCNC: 0.3 MG/DL (ref 0–1)
BUN BLDV-MCNC: 14 MG/DL (ref 7–20)
CALCIUM SERPL-MCNC: 9.4 MG/DL (ref 8.3–10.6)
CHLORIDE BLD-SCNC: 104 MMOL/L (ref 99–110)
CO2: 24 MMOL/L (ref 21–32)
CREAT SERPL-MCNC: 0.8 MG/DL (ref 0.6–1.2)
GFR AFRICAN AMERICAN: >60
GFR NON-AFRICAN AMERICAN: >60
GLUCOSE BLD-MCNC: 160 MG/DL (ref 70–99)
POTASSIUM SERPL-SCNC: 3.9 MMOL/L (ref 3.5–5.1)
SODIUM BLD-SCNC: 141 MMOL/L (ref 136–145)
TOTAL PROTEIN: 7.4 G/DL (ref 6.4–8.2)

## 2021-11-10 PROCEDURE — A9577 INJ MULTIHANCE: HCPCS | Performed by: ORTHOPAEDIC SURGERY

## 2021-11-10 PROCEDURE — 80053 COMPREHEN METABOLIC PANEL: CPT

## 2021-11-10 PROCEDURE — 6360000004 HC RX CONTRAST MEDICATION: Performed by: ORTHOPAEDIC SURGERY

## 2021-11-10 PROCEDURE — 2580000003 HC RX 258: Performed by: ORTHOPAEDIC SURGERY

## 2021-11-10 PROCEDURE — 73723 MRI JOINT LWR EXTR W/O&W/DYE: CPT

## 2021-11-10 PROCEDURE — 36415 COLL VENOUS BLD VENIPUNCTURE: CPT

## 2021-11-10 RX ORDER — SODIUM CHLORIDE 0.9 % (FLUSH) 0.9 %
10 SYRINGE (ML) INJECTION ONCE
Status: COMPLETED | OUTPATIENT
Start: 2021-11-10 | End: 2021-11-10

## 2021-11-10 RX ADMIN — GADOBENATE DIMEGLUMINE 18 ML: 529 INJECTION, SOLUTION INTRAVENOUS at 14:42

## 2021-11-10 RX ADMIN — Medication 10 ML: at 14:44

## 2021-11-18 ENCOUNTER — TELEPHONE (OUTPATIENT)
Dept: ORTHOPEDIC SURGERY | Age: 71
End: 2021-11-18

## 2021-11-18 NOTE — TELEPHONE ENCOUNTER
Test Results     Type of Test: MRI LEFT ANKLE  Date of Test: 11/10/21  Location of Test: [address] [place] [address]  Patient Contact Number: [phone] MESSAGE CAN BE LEFT [phone]

## 2021-11-18 NOTE — TELEPHONE ENCOUNTER
**per SMA, mass is a lipoma as discussed in office.  If patient wants it removed she can f/u in the office to discuss sx**

## 2021-12-01 ENCOUNTER — OFFICE VISIT (OUTPATIENT)
Dept: ORTHOPEDIC SURGERY | Age: 71
End: 2021-12-01
Payer: MEDICARE

## 2021-12-01 VITALS — HEIGHT: 64 IN | BODY MASS INDEX: 36.05 KG/M2

## 2021-12-01 DIAGNOSIS — R22.42 ANKLE MASS, LEFT: Primary | ICD-10-CM

## 2021-12-01 PROCEDURE — 99214 OFFICE O/P EST MOD 30 MIN: CPT | Performed by: ORTHOPAEDIC SURGERY

## 2021-12-01 NOTE — LETTER
[address] [facility]  Surgery Scheduling Form:      DEMOGRAPHICS:                                                                                                                Patient Name:  Althea Pratt  Patient :  1950   Patient SS#:      Patient Phone:  [phone] (home)  Alt. Patient Phone:    Patient Address:  [address] [place] [id number]    PCP:  No primary care provider on file. Payor/Plan Subscr  Sex Relation Sub. Ins. ID Effective Group Num   1. Michael Delatorre* SHILPA AVERY 1950 Female Self [address] [id number]                                   [address]       DIAGNOSIS & PROCEDURE:                                                                                              Diagnosis:   Left ankle mass R22.42  Operation:  Mass removal left ankle  [id number]  Location:  [facility] [place]  Surgeon:  Michael Smith MD    SCHEDULING INFORMATION:                                                                                         .    Surgeon's Scheduling Instruction:  elective  Requested Date: 22    OR Time:  7:15 Patient Arrival Time:  5:45  OR Time Required:  30  Minutes  Anesthesia:  General    SA Required:  Yes  Equipment:  none  Mini C-Arm:  No    Standard C-Arm:  No  Status:  Outpatient    PAT Required:  Yes  Latex Allergy:  unknown    Defibrilator or Pacemaker:  unknown  Isolation Precautions:  unknown  Comments: COVID: tested + on 21                        Viktor Gar MD 21   BILLING INFORMATION:                                                                                                     Procedure:       CPT Code Modifier                Pre Operative Physician Prophylaxis Orders - SCIP Protocols      Patient: Althea Pratt  :    1950    Procedure: 21 Mass removal left ankle      COVID : tested + on 21  HEIGHT:  Ht Readings from Last 1 Encounters:   21 5' 4\" (1.626 m) WEIGHT:  Wt Readings from Last 1 Encounters:   10/07/21 210 lb (95.3 kg)         History & Physical:  Nemesis. ] By Surgeon  [ ]By PCP  [ Mildred Phelps Report     Pre-Operative Antibiotic Order:     []  ----  No Antibiotic Ordered       [x]  ----  Give the following Antibiotic within 1 hour prior to start time:                                                      Cefazolin 2g IV <119.9kg (264lbs) OR 3g if >120kg (680HCP)       or      Clindamycin 900 mg IV (over 1 hour) if patient is allergic to           PENICILLINS or CAPHALOSPORIN       VTE prophylaxis [ ] Thigh hi  TEDS  [ ]  Knee Hi TEDS [ ] Foot Pumps [ ] Compression Devices      Physician Signature:     Date: 12/1/21  Time: 2:57 PM

## 2021-12-02 ENCOUNTER — TELEPHONE (OUTPATIENT)
Dept: ORTHOPEDIC SURGERY | Age: 71
End: 2021-12-02

## 2021-12-04 NOTE — PROGRESS NOTES
CHIEF COMPLAINT: Left lateral foot and ankle pain/ mass    HISTORY:  Ms. Yennifer Baker 73 y.o.  female presents today for f/u evaluation of left lateral foot and ankle pain with mass which started to worsen and enlarge over the past 2 years.  She is complaining of achy, mild persistent pain. Pain is increase with  shoe wear. Pain is achy with first few steps, dull achy pain by the end of the day. She rates her pain a 4-7/10 VAS. Pain is intermittent. No radiation and no numbness and tingling sensation. No other complaint. Denies smoking.   She is known to our office and sees Dr. Rj Villar for left hip tendinitis, most recent seen on 1/6/2021    Past Medical History:   Diagnosis Date    Alopecia     Arthritis     right knee    Cancer ([address] 2008    breast(right)  mastectomy only on tamoxifen for 2 years    Degeneration of lumbar intervertebral disc     Depression     GERD (gastroesophageal reflux disease)     Hyperglycemia     Hyperlipidemia     Hyperlipidemia     Hypertension     Insomnia     lumbar spine     herniated disk low back/receives pain injections by Dr. Burr Stacks of knees, bilateral     SBO (small bowel obstruction) ([address] 9/2/2015    Sjogren's disease ([address]        Past Surgical History:   Procedure Laterality Date    BREAST SURGERY  2008    right reconst    CARPAL TUNNEL RELEASE Right 2015    CHOLECYSTECTOMY      COLONOSCOPY      EYE SURGERY Bilateral 2005    lasix    FOOT SURGERY  11/29/11    bone spur removal in the 1st MTP joint and ganglion cyst removal in 2 MTP    HYSTERECTOMY      Complete    KNEE SURGERY Bilateral     meniscal repairs    MASTECTOMY Right     OTHER SURGICAL HISTORY      arm lesion on right arm    REFRACTIVE SURGERY      TONSILLECTOMY      TOTAL KNEE ARTHROPLASTY Left 09/26/2017    left total knee, right knee cortisone injection    TOTAL KNEE ARTHROPLASTY Right 12/12/2017    with Dr. Rj Villar at [facility] Socioeconomic History    Marital status:      Spouse name: Not on file    Number of children: 1    Years of education: Not on file    Highest education level: Not on file   Occupational History    Occupation: retired teacher   Tobacco Use    Smoking status: Never Smoker    Smokeless tobacco: Never Used   Vaping Use    Vaping Use: Never used   Substance and Sexual Activity    Alcohol use: Yes     Alcohol/week: 7.0 standard drinks     Types: 7 Glasses of wine per week     Comment: 1 glass of wine nightly    Drug use: No    Sexual activity: Not on file   Other Topics Concern    Not on file   Social History Narrative    Not on file     Social Determinants of Health     Financial Resource Strain:     Difficulty of Paying Living Expenses: Not on file   Food Insecurity:     Worried About Running Out of Food in the Last Year: Not on file    Henry of Food in the Last Year: Not on file   Transportation Needs:     Lack of Transportation (Medical): Not on file    Lack of Transportation (Non-Medical):  Not on file   Physical Activity:     Days of Exercise per Week: Not on file    Minutes of Exercise per Session: Not on file   Stress:     Feeling of Stress : Not on file   Social Connections:     Frequency of Communication with Friends and Family: Not on file    Frequency of Social Gatherings with Friends and Family: Not on file    Attends Shinto Services: Not on file    Active Member of [address] [place] [address] or Organizations: Not on file    Attends Club or Organization Meetings: Not on file    Marital Status: Not on file   Intimate Partner Violence:     Fear of Current or Ex-Partner: Not on file    Emotionally Abused: Not on file    Physically Abused: Not on file    Sexually Abused: Not on file   Housing Stability:     Unable to Pay for Housing in the Last Year: Not on file    Number of [place] in the Last Year: Not on file    Unstable Housing in the Last Year: Not on file       Family History   Problem Relation Age of Onset    Heart Disease Mother     Heart Disease Father     Diabetes Father        Current Outpatient Medications on File Prior to Visit   Medication Sig Dispense Refill    montelukast (SINGULAIR) 10 MG tablet Take 1 tablet by mouth nightly 15 tablet 1    pantoprazole (PROTONIX) 40 MG tablet Take 40 mg by mouth daily      meloxicam (MOBIC) 15 MG tablet Take 15 mg by mouth daily      traZODone (DESYREL) 50 MG tablet Take 50 mg by mouth nightly      Red Yeast Rice Extract (RED YEAST RICE PO) Take by mouth      Multiple Vitamins-Minerals (HAIR/SKIN/NAILS PO) Take by mouth      losartan (COZAAR) 100 MG tablet       Multiple Vitamins-Minerals (ZINC) LOZG Take by mouth daily  0    Zinc 25 MG TABS Take 0.5 tablets by mouth daily      Biotin 1 MG CAPS Take 1 tablet by mouth daily      amLODIPine (NORVASC) 5 MG tablet Take 5 mg by mouth daily.  Doxylamine Succinate, Sleep, (SLEEP AID PO) Take 1 capsule by mouth nightly       Ascorbic Acid (VITAMIN C) 500 MG tablet Take 500 mg by mouth daily.  Calcium Carbonate-Vitamin D (CALCIUM + D PO) Take 1 tablet by mouth daily       vitamin D (CHOLECALCIFEROL) 400 UNIT TABS tablet Take 1,000 Units by mouth daily       Magnesium 500 MG TABS Take 1 tablet by mouth daily       FISH OIL Take 1 tablet by mouth daily        No current facility-administered medications on file prior to visit. Pertinent items are noted in HPI  Review of systems reviewed from Patient History Form dated on 10/5/2021 and available in the patient's chart under the Media tab. No change noted. PHYSICAL EXAMINATION:  Ms. Lisa Leija is a very pleasant 73 y.o.  female who presents today in no acute distress, awake, alert, and oriented. She is well dressed, nourished and  groomed. Patient with normal affect. Height is  5' 4\" (1.626 m), weight is  , Body mass index is 36.05 kg/m². Resting respiratory rate is 16.      Examination of the gait, showed that the patient walks heel-toe with minimal limp.  Examination of both ankles showing dorsiflexion to about 10 degrees bilaterally, which increased with knee flexion. She has intact sensation and good pedal pulses.  She has good strength in all four planes, including eversion, and has moderate tenderness on deep palpation over the left lateral foot and ankle, with prominent swelling/mass compared to the other side.  The ankles are stable to drawer test bilaterally, equally.      10/5/2021        IMAGING:  Starr Pedroza were reviewed, 3 views of the left ankle taken in office 10/5/2021, and showed no acute fracture. There is prominent soft tissue swelling left lateral ankle. MRI left ankle dated 11/10/2021 was reviewed and showed     Fairly well-circumscribed subcutaneous fat in the region of interest which   appears at least partially encapsulated and is most compatible cyst with   lipoma.  This measures 2.0 x 3.7 x 3.9 cm.  No suspicious imaging features.       Mild-to-moderate hindfoot and midfoot osteoarthritis with small subtalar   effusion.       Mild tenosynovitis of the intact posterior tibialis and flexor digitorum   longus tendons. IMPRESSION: Left lateral foot and ankle pain/mass. PLAN:  I discussed with the patient the MRI findings and the treatment options including both surgical and non-surgical treatment, and that my recommendation is surgical excision given the amount of symptoms. I discussed the risks and benefits of surgery with the patient, including but not limited to infection, bleeding, pain, injury to nerves or blood vessels failure of the surgery and need for additional surgery. All the patient's questions were answered. We discussed an expected post-operative course. She  is understanding of this and wishes to proceed.       Mikala Cowan MD

## 2021-12-13 ENCOUNTER — TELEPHONE (OUTPATIENT)
Dept: ORTHOPEDIC SURGERY | Age: 71
End: 2021-12-13

## 2021-12-13 NOTE — TELEPHONE ENCOUNTER
General Question     Subject: PATIENT HAS [facility] FOR 12/16 BUT SHE TESTED POSITIVE FOR COVID. PLEASE ADVISE.    Patient: Marisol Edgar  Contact Number: [phone]

## 2021-12-13 NOTE — TELEPHONE ENCOUNTER
Called and spoke to patient's . They just saw the paperwork stating they needed a physical exam from their PCP in the paperwork and were worried due to not being able to complete it in time for sx on 12/16/2021. Notified patient per Donna Tang due to being seen within 30 days of the sx they do not need blood work or a physical exam. Patient  understood.

## 2021-12-29 ENCOUNTER — TELEPHONE (OUTPATIENT)
Dept: ORTHOPEDIC SURGERY | Age: 71
End: 2021-12-29

## 2021-12-29 NOTE — TELEPHONE ENCOUNTER
Auth: NPR  Date: 01/06/22  Reference # [id number]  Spoke with: Online  Type of SX: Outpatient  Location: [facility]  CPT: [id number]   DX: R22.42  SX area: Lt ankle  Insurance: [employer]

## 2022-01-05 RX ORDER — KRILL/OM-3/DHA/EPA/PHOSPHO/AST 500MG-86MG
CAPSULE ORAL
COMMUNITY

## 2022-01-05 NOTE — PROGRESS NOTES
Name_______________________________________Printed:____________________  Date and time of surgery__01/06/22____0715__________________Arrival Time:____0600___[facility]_________   1. The instructions given regarding when and if a patient needs to stop oral intake prior to surgery varies. Follow the specific instructions you were given                  _X__Nothing to eat or to drink after Midnight the night before.                   ____Carbo loading or ERAS instructions will be given to select patients-if you have been given those instructions -please do the following                           The evening before your surgery after dinner before midnight drink 40 ounces of gatorade. If you are diabetic use sugar free. The morning of surgery drink 40 ounces of water. This needs to be finished 3 hours prior to your surgery start time. 2. Take the following pills with a small sip of water on the morning of surgery___Norvasc Pantoprazole________________________________________________                  Do not take blood pressure medications ending in pril or sartan the azeem prior to surgery or the morning of surgery_   3. Aspirin, Ibuprofen, Advil, Naproxen, Vitamin E and other Anti-inflammatory products and supplements should be stopped for 5 -7days before surgery or as directed by your physician. 4. Check with your Doctor regarding stopping Plavix, Coumadin,Eliquis, Lovenox,Effient,Pradaxa,Xarelto, Fragmin or other blood thinners and follow their instructions. 5. Do not smoke, and do not drink any alcoholic beverages 24 hours prior to surgery. This includes NA Beer. Refrain from the usage of any recreational drugs. 6. You may brush your teeth and gargle the morning of surgery. DO NOT SWALLOW WATER   7. You MUST make arrangements for a responsible adult to stay on site while you are here and take you home after your surgery. You will not be allowed to leave alone or drive yourself home.   It is strongly suggested someone stay with you the first 24 hrs. Your surgery will be cancelled if you do not have a ride home. 8. A parent/legal guardian must accompany a child scheduled for surgery and plan to stay at the hospital until the child is discharged. Please do not bring other children with you. 9. Please wear simple, loose fitting clothing to the hospital.  Rosana Standish not bring valuables (money, credit cards, checkbooks, etc.) Do not wear any makeup (including no eye makeup) or nail polish on your fingers or toes. 10. DO NOT wear any jewelry or piercings on day of surgery. All body piercing jewelry must be removed. 11. If you have ___dentures, they will be removed before going to the OR; we will provide you a container. If you wear ___contact lenses or ___glasses, they will be removed; please bring a case for them. 12. Please see your family doctor/pediatrician for a history & physical and/or concerning medications. Bring any test results/reports from your physician's office. PCP__Dr. Theodore________________Phone___________H&P Appt. Date________             13 If you  have a Living Will and Durable Power of  for Healthcare, please bring in a copy. 15. Notify your Surgeon if you develop any illness between now and surgery  time, cough, cold, fever, sore throat, nausea, vomiting, etc.  Please notify your surgeon if you experience dizziness, shortness of breath or blurred vision between now & the time of your surgery             15. DO NOT shave your operative site 96 hours prior to surgery. For face & neck surgery, men may use an electric razor 48 hours prior to surgery. 16. Shower the night before or morning of surgery using an antibacterial soap or as you have been instructed. 17. To provide excellent care visitors will be limited to one in the room at any given time. 18.  Please bring picture ID and insurance card.              19.  Visit our web site for additional information:  [email or web address]              20.During flu season no children under the age of 15 are permitted in the hospital for the safety of all patients. 21. If you take a long acting insulin in the evening only  take half of your usual  dose the night  before your procedure              22. If you use a c-pap please bring DOS if staying overnight,             23.For your convenience [facility] has a pharmacy on site to fill your prescriptions. 24. If you use oxygen and have a portable tank please bring it  with you the DOS             25. Bring a complete list of all your medications with name and dose include any supplements. 26. Other___Has H&P Will bring DOS_______________________________________   *Please call pre admission testing if you any further questions   [facility]         [phone]   [facility] [address]    [address]  [phone]   [facility] [address] LIDIA MCQUEEN   [phone]           COVID TESTING    _X__ Covid test to be done 3-5 days prior to scheduled surgery -patient aware they are REQUIRED to bring a copy of the negative result DOS-if they receive a positive result to notify their surgeon         If known - indicate where patient is getting covid test done ____Positive  done on 12/10___Will bring copy of positive DOS____________________________________________________    ___ Rapid - DOS    ___ Other___________________________________      Cleo Mondragon POLICY(subject to change)    There is a one visitor policy at [facility] for all surgeries and endoscopies. Whether the visitor can stay or will be asked to wait in the car will depend on the current policy and if social distancing can be maintained. The policy is subject to change at any time. Please make sure the visitor has a cell phone that is on,charged and able to accept calls, as this may be the way that the staff communicates with them. Pain management is NO VISITOR policyThe patients ride is expected to remain in the car with a cell phone for communication. If the ride is leaving the hospital grounds please make sure they are back in time for pickup. Have the patient inform the staff on arrival what their rides plans are while the patient is in the facility. At the MAIN there is one visitor allowed. Please note that the visitor policy is subject to change. All above information reviewed with patient in person or by phone. Patient verbalizes understanding. All questions and concerns addressed.                                                                                                  Patient/Rep__Patient__________________                                                                                                                                    PRE OP INSTRUCTIONS

## 2022-01-05 NOTE — PROGRESS NOTES
Spoke with Qi SINGH regarding patient being 24 days out from 1st positive covid. Has been asymptomatic for 2 weeks. Verbal ok given to proceed with surgery.

## 2022-01-06 ENCOUNTER — TELEPHONE (OUTPATIENT)
Dept: ORTHOPEDIC SURGERY | Age: 72
End: 2022-01-06

## 2022-01-06 ENCOUNTER — ANESTHESIA (OUTPATIENT)
Dept: OPERATING ROOM | Age: 72
End: 2022-01-06
Payer: MEDICARE

## 2022-01-06 ENCOUNTER — HOSPITAL ENCOUNTER (OUTPATIENT)
Age: 72
Setting detail: OUTPATIENT SURGERY
Discharge: HOME | End: 2022-01-06
Attending: ORTHOPAEDIC SURGERY | Admitting: ORTHOPAEDIC SURGERY
Payer: MEDICARE

## 2022-01-06 ENCOUNTER — ANESTHESIA EVENT (OUTPATIENT)
Dept: OPERATING ROOM | Age: 72
End: 2022-01-06
Payer: MEDICARE

## 2022-01-06 VITALS
TEMPERATURE: 98 F | OXYGEN SATURATION: 94 % | WEIGHT: 218 LBS | BODY MASS INDEX: 37.22 KG/M2 | HEIGHT: 64 IN | DIASTOLIC BLOOD PRESSURE: 64 MMHG | RESPIRATION RATE: 18 BRPM | HEART RATE: 76 BPM | SYSTOLIC BLOOD PRESSURE: 142 MMHG

## 2022-01-06 VITALS
OXYGEN SATURATION: 94 % | TEMPERATURE: 96.4 F | SYSTOLIC BLOOD PRESSURE: 95 MMHG | DIASTOLIC BLOOD PRESSURE: 51 MMHG | RESPIRATION RATE: 7 BRPM

## 2022-01-06 DIAGNOSIS — R22.42 ANKLE MASS, LEFT: Primary | ICD-10-CM

## 2022-01-06 PROCEDURE — 3700000000 HC ANESTHESIA ATTENDED CARE: Performed by: ORTHOPAEDIC SURGERY

## 2022-01-06 PROCEDURE — 3600000012 HC SURGERY LEVEL 2 ADDTL 15MIN: Performed by: ORTHOPAEDIC SURGERY

## 2022-01-06 PROCEDURE — 27634 EXC LEG/ANKLE TUM DEP 5 CM/>: CPT | Performed by: ORTHOPAEDIC SURGERY

## 2022-01-06 PROCEDURE — 2500000003 HC RX 250 WO HCPCS: Performed by: NURSE ANESTHETIST, CERTIFIED REGISTERED

## 2022-01-06 PROCEDURE — 2500000003 HC RX 250 WO HCPCS: Performed by: ORTHOPAEDIC SURGERY

## 2022-01-06 PROCEDURE — 88304 TISSUE EXAM BY PATHOLOGIST: CPT

## 2022-01-06 PROCEDURE — 7100000011 HC PHASE II RECOVERY - ADDTL 15 MIN: Performed by: ORTHOPAEDIC SURGERY

## 2022-01-06 PROCEDURE — 6360000002 HC RX W HCPCS: Performed by: ORTHOPAEDIC SURGERY

## 2022-01-06 PROCEDURE — 2709999900 HC NON-CHARGEABLE SUPPLY: Performed by: ORTHOPAEDIC SURGERY

## 2022-01-06 PROCEDURE — 7100000000 HC PACU RECOVERY - FIRST 15 MIN: Performed by: ORTHOPAEDIC SURGERY

## 2022-01-06 PROCEDURE — 3600000002 HC SURGERY LEVEL 2 BASE: Performed by: ORTHOPAEDIC SURGERY

## 2022-01-06 PROCEDURE — 3700000001 HC ADD 15 MINUTES (ANESTHESIA): Performed by: ORTHOPAEDIC SURGERY

## 2022-01-06 PROCEDURE — 2580000003 HC RX 258: Performed by: ANESTHESIOLOGY

## 2022-01-06 PROCEDURE — 6360000002 HC RX W HCPCS: Performed by: NURSE ANESTHETIST, CERTIFIED REGISTERED

## 2022-01-06 PROCEDURE — 7100000001 HC PACU RECOVERY - ADDTL 15 MIN: Performed by: ORTHOPAEDIC SURGERY

## 2022-01-06 PROCEDURE — 7100000010 HC PHASE II RECOVERY - FIRST 15 MIN: Performed by: ORTHOPAEDIC SURGERY

## 2022-01-06 RX ORDER — FENTANYL CITRATE 50 UG/ML
INJECTION, SOLUTION INTRAMUSCULAR; INTRAVENOUS PRN
Status: DISCONTINUED | OUTPATIENT
Start: 2022-01-06 | End: 2022-01-06 | Stop reason: SDUPTHER

## 2022-01-06 RX ORDER — LIDOCAINE HYDROCHLORIDE 20 MG/ML
INJECTION, SOLUTION INFILTRATION; PERINEURAL PRN
Status: DISCONTINUED | OUTPATIENT
Start: 2022-01-06 | End: 2022-01-06 | Stop reason: SDUPTHER

## 2022-01-06 RX ORDER — SODIUM CHLORIDE 9 MG/ML
INJECTION, SOLUTION INTRAVENOUS CONTINUOUS
Status: DISCONTINUED | OUTPATIENT
Start: 2022-01-06 | End: 2022-01-06 | Stop reason: HOSPADM

## 2022-01-06 RX ORDER — TRAMADOL HYDROCHLORIDE 50 MG/1
50 TABLET ORAL EVERY 6 HOURS PRN
Qty: 12 TABLET | Refills: 0 | Status: SHIPPED | OUTPATIENT
Start: 2022-01-06 | End: 2022-01-09

## 2022-01-06 RX ORDER — ONDANSETRON 2 MG/ML
INJECTION INTRAMUSCULAR; INTRAVENOUS PRN
Status: DISCONTINUED | OUTPATIENT
Start: 2022-01-06 | End: 2022-01-06 | Stop reason: SDUPTHER

## 2022-01-06 RX ORDER — PROPOFOL 10 MG/ML
INJECTION, EMULSION INTRAVENOUS PRN
Status: DISCONTINUED | OUTPATIENT
Start: 2022-01-06 | End: 2022-01-06 | Stop reason: SDUPTHER

## 2022-01-06 RX ORDER — DEXAMETHASONE SODIUM PHOSPHATE 4 MG/ML
INJECTION, SOLUTION INTRA-ARTICULAR; INTRALESIONAL; INTRAMUSCULAR; INTRAVENOUS; SOFT TISSUE PRN
Status: DISCONTINUED | OUTPATIENT
Start: 2022-01-06 | End: 2022-01-06 | Stop reason: SDUPTHER

## 2022-01-06 RX ORDER — CEPHALEXIN 500 MG/1
500 CAPSULE ORAL 4 TIMES DAILY
Qty: 20 CAPSULE | Refills: 0 | Status: SHIPPED | OUTPATIENT
Start: 2022-01-06 | End: 2022-01-11

## 2022-01-06 RX ORDER — BUPIVACAINE HYDROCHLORIDE 5 MG/ML
INJECTION, SOLUTION EPIDURAL; INTRACAUDAL
Status: COMPLETED | OUTPATIENT
Start: 2022-01-06 | End: 2022-01-06

## 2022-01-06 RX ADMIN — ONDANSETRON 4 MG: 2 INJECTION INTRAMUSCULAR; INTRAVENOUS at 07:32

## 2022-01-06 RX ADMIN — PROPOFOL 150 MG: 10 INJECTION, EMULSION INTRAVENOUS at 07:21

## 2022-01-06 RX ADMIN — PROPOFOL 50 MG: 10 INJECTION, EMULSION INTRAVENOUS at 07:32

## 2022-01-06 RX ADMIN — CEFAZOLIN 2000 MG: 10 INJECTION, POWDER, FOR SOLUTION INTRAVENOUS at 07:11

## 2022-01-06 RX ADMIN — SODIUM CHLORIDE: 9 INJECTION, SOLUTION INTRAVENOUS at 06:53

## 2022-01-06 RX ADMIN — PROPOFOL 50 MG: 10 INJECTION, EMULSION INTRAVENOUS at 07:23

## 2022-01-06 RX ADMIN — LIDOCAINE HYDROCHLORIDE 100 MG: 20 INJECTION, SOLUTION INFILTRATION; PERINEURAL at 07:21

## 2022-01-06 RX ADMIN — FENTANYL CITRATE 100 MCG: 50 INJECTION, SOLUTION INTRAMUSCULAR; INTRAVENOUS at 07:32

## 2022-01-06 RX ADMIN — DEXAMETHASONE SODIUM PHOSPHATE 8 MG: 4 INJECTION, SOLUTION INTRAMUSCULAR; INTRAVENOUS at 07:32

## 2022-01-06 ASSESSMENT — PULMONARY FUNCTION TESTS
PIF_VALUE: 1
PIF_VALUE: 26
PIF_VALUE: 0
PIF_VALUE: 12
PIF_VALUE: 11
PIF_VALUE: 19
PIF_VALUE: 1
PIF_VALUE: 10
PIF_VALUE: 21
PIF_VALUE: 10
PIF_VALUE: 3
PIF_VALUE: 27
PIF_VALUE: 0
PIF_VALUE: 18
PIF_VALUE: 12
PIF_VALUE: 11
PIF_VALUE: 0
PIF_VALUE: 6
PIF_VALUE: 11
PIF_VALUE: 2
PIF_VALUE: 22
PIF_VALUE: 10
PIF_VALUE: 11
PIF_VALUE: 1
PIF_VALUE: 11
PIF_VALUE: 12
PIF_VALUE: 3
PIF_VALUE: 11
PIF_VALUE: 16
PIF_VALUE: 11
PIF_VALUE: 22
PIF_VALUE: 11
PIF_VALUE: 1
PIF_VALUE: 16
PIF_VALUE: 13
PIF_VALUE: 10

## 2022-01-06 ASSESSMENT — PAIN - FUNCTIONAL ASSESSMENT: PAIN_FUNCTIONAL_ASSESSMENT: 0-10

## 2022-01-06 NOTE — H&P
Preoperative H&P Update    The patient's History and Physical in the medical record from 1/6/2022 and 12/4/2021 was reviewed by me today. Past Medical History:   Diagnosis Date    Alopecia     Arthritis     right knee    [address] [address] 2008    breast(right)  mastectomy only on tamoxifen for 2 years    Degeneration of lumbar intervertebral disc     Depression     GERD (gastroesophageal reflux disease)     Hyperglycemia     Hyperlipidemia     Hyperlipidemia     Hypertension     Insomnia     lumbar spine     herniated disk low back/receives pain injections by Dr. Salvatore Castrejon Osteoarthritis of knees, bilateral     SBO (small bowel obstruction) ([facility] [address] [address] 9/2/2015    Sjogren's disease ([facility] [address] [facility]      Past Surgical History:   Procedure Laterality Date    BREAST SURGERY  2008    right reconst    CARPAL TUNNEL RELEASE Right 2015    CHOLECYSTECTOMY      COLONOSCOPY      EYE SURGERY Bilateral 2005    lasix    FOOT SURGERY  11/29/11    bone spur removal in the 1st MTP joint and ganglion cyst removal in 2 MTP    HYSTERECTOMY      Complete    KNEE SURGERY Bilateral     meniscal repairs    MASTECTOMY Right     OTHER SURGICAL HISTORY      arm lesion on right arm    REFRACTIVE SURGERY      TONSILLECTOMY      TOTAL KNEE ARTHROPLASTY Left 09/26/2017    left total knee, right knee cortisone injection    TOTAL KNEE ARTHROPLASTY Right 12/12/2017    with Dr. Shalom Lugo at [facility]     No current facility-administered medications on file prior to encounter.      Current Outpatient Medications on File Prior to Encounter   Medication Sig Dispense Refill    Krill Oil 500 MG CAPS Take by mouth      pantoprazole (PROTONIX) 40 MG tablet Take 40 mg by mouth daily      meloxicam (MOBIC) 15 MG tablet Take 15 mg by mouth daily      traZODone (DESYREL) 50 MG tablet Take 50 mg by mouth nightly      Red Yeast Rice Extract (RED YEAST RICE PO) Take by mouth      losartan (COZAAR) 100 MG tablet       Zinc 25 MG TABS Take 0.5

## 2022-01-06 NOTE — PROGRESS NOTES
Received in PACU Phase 1 from OR. Alert. Respirations easy. O2 continued at 6L/simple mask. Left foot/ankle dressing clean, dry and intact. LLE elevated on pillow. Ice pack applied. Denies any pain or nausea.

## 2022-01-06 NOTE — PROGRESS NOTES
Doing well. Able to tolerate ice chips and fluids. Denies any discomfort. VSS. Respirations easy on room air. Moved to PACU Phase 2 Care.

## 2022-01-06 NOTE — PROGRESS NOTES
Discharged instructions and prescriptions reviewed with patient. Questions answered, copy given. Doing well. Denies nausea or pain. Respirations easy on room air. VSS. Ready for discharge.

## 2022-01-06 NOTE — ANESTHESIA PRE PROCEDURE
Department of Anesthesiology  Preprocedure Note       Name:  Mine Syed   Age:  81 y.o.  :  1950                                          MRN:  [id number]         Date:  2022      Surgeon: Francoise Dutta):  Ena Celis MD    Procedure: Procedure(s):  MASS REMOVAL LEFT ANKLE    Medications prior to admission:   Prior to Admission medications    Medication Sig Start Date End Date Taking? Authorizing Provider   traMADol (ULTRAM) 50 MG tablet Take 1 tablet by mouth every 6 hours as needed for Pain for up to 3 days. Intended supply: 3 days. Take lowest dose possible to manage pain 22 Yes Ena Celis MD   cephALEXin (KEFLEX) 500 MG capsule Take 1 capsule by mouth 4 times daily for 5 days 22 Yes Ena Celis MD   Lalo Ferns 500 MG CAPS Take by mouth   Yes Historical Provider, MD   pantoprazole (PROTONIX) 40 MG tablet Take 40 mg by mouth daily   Yes Historical Provider, MD   meloxicam (MOBIC) 15 MG tablet Take 15 mg by mouth daily   Yes Historical Provider, MD   traZODone (DESYREL) 50 MG tablet Take 50 mg by mouth nightly 17  Yes Historical Provider, MD   Red Yeast Rice Extract (RED YEAST RICE PO) Take by mouth   Yes Historical Provider, MD   losartan (COZAAR) 100 MG tablet  9/17/15  Yes Historical Provider, MD   Zinc 25 MG TABS Take 0.5 tablets by mouth daily   Yes Historical Provider, MD   Biotin 1 MG CAPS Take 1 tablet by mouth daily   Yes Historical Provider, MD   amLODIPine (NORVASC) 5 MG tablet Take 5 mg by mouth daily. 13  Yes Historical Provider, MD   Doxylamine Succinate, Sleep, (SLEEP AID PO) Take 1 capsule by mouth nightly    Yes Historical Provider, MD   Ascorbic Acid (VITAMIN C) 500 MG tablet Take 500 mg by mouth daily.      Yes Historical Provider, MD   Calcium Carbonate-Vitamin D (CALCIUM + D PO) Take 1 tablet by mouth daily    Yes Historical Provider, MD   vitamin D (CHOLECALCIFEROL) 400 UNIT TABS tablet Take 1,000 Units by mouth daily    Yes Historical Provider, MD   Magnesium 500 MG TABS Take 1 tablet by mouth daily    Yes Historical Provider, MD       Current medications:    Current Facility-Administered Medications   Medication Dose Route Frequency Provider Last Rate Last Admin    0.9 % sodium chloride infusion   IntraVENous Continuous Lucinda Starr  mL/hr at 01/06/22 0715 Restarted at 01/06/22 0751       Allergies:     Allergies   Allergen Reactions    Bupropion Hives    Dust Mite Extract     Ezetimibe Other (See Comments)     Cramping in legs, nausea, back pain    Lisinopril Other (See Comments)     cough    Statins Nausea Only    Welchol  [Colesevelam Hcl] Nausea Only    Wellbutrin [Bupropion Hcl] Hives    Codeine Nausea Only    Diclofenac Rash and Other (See Comments)     Topical type caused patient to break out in rash    Other Nausea And Vomiting and Other (See Comments)     Onions and cucumbers  Cats and dogs       Problem List:    Patient Active Problem List   Diagnosis Code    Hallux rigidus M20.20    HTN (hypertension) I10    Hypercholesteremia E78.00    Malignant neoplasm of overlapping sites of right female breast ([address] C50.811    Perennial allergic rhinitis J30.89    Sinus infection J32.9    Small bowel obstruction ([address] K56.609    Sjogren's disease ([address] M35.00    Primary osteoarthritis involving multiple joints M89.49    Acute non-recurrent pansinusitis J01.40    9/26/17 LEFT TKA M17.12    12/12/17 RIGHT TKA M17.11    Thyroid nodule E04.1    Hip tendonitis, left M76.892    Lumbar radicular pain M54.16    Greater trochanteric bursitis of left hip M70.62    Ankle mass, left R22.42       Past Medical History:        Diagnosis Date    Alopecia     Arthritis     right knee    Cancer ([address] 2008    breast(right)  mastectomy only on tamoxifen for 2 years    Degeneration of lumbar intervertebral disc     Depression     GERD (gastroesophageal reflux disease)     Hyperglycemia     Hyperlipidemia     Hyperlipidemia     Hypertension     Insomnia     lumbar spine     herniated disk low back/receives pain injections by Dr. Yovanny Fenton Osteoarthritis of knees, bilateral     SBO (small bowel obstruction) ([facility] 9/2/2015    Sjogren's disease ([facility]        Past Surgical History:        Procedure Laterality Date    BREAST SURGERY  2008    right reconst    CARPAL TUNNEL RELEASE Right 2015    CHOLECYSTECTOMY      COLONOSCOPY      EYE SURGERY Bilateral 2005    lasix    FOOT SURGERY  11/29/11    bone spur removal in the 1st MTP joint and ganglion cyst removal in 2 MTP    HYSTERECTOMY      Complete    KNEE SURGERY Bilateral     meniscal repairs    MASTECTOMY Right     OTHER SURGICAL HISTORY      arm lesion on right arm    REFRACTIVE SURGERY      TONSILLECTOMY      TOTAL KNEE ARTHROPLASTY Left 09/26/2017    left total knee, right knee cortisone injection    TOTAL KNEE ARTHROPLASTY Right 12/12/2017    [facility] Veronica [facility]       Social History:    Social History     Tobacco Use    Smoking status: Never Smoker    Smokeless tobacco: Never Used   Substance Use Topics    Alcohol use:  Yes     Alcohol/week: 7.0 standard drinks     Types: 7 Glasses of wine per week     Comment: 1 glass of wine nightly                                Counseling given: Not Answered      Vital Signs (Current):   Vitals:    01/05/22 1019 01/06/22 0635   BP:  (!) 140/80   Pulse:  83   Resp:  20   Temp:  96.4 °F (35.8 °C)   TempSrc:  Temporal   SpO2:  92%   Weight: 210 lb (95.3 kg) 218 lb (98.9 kg)   Height: 5' 4\" (1.626 m) 5' 4\" (1.626 m)                                              BP Readings from Last 3 Encounters:   01/06/22 (!) 140/80   01/06/22 (!) 95/51   08/20/20 (!) 150/90       NPO Status: Time of last liquid consumption: 0545                        Time of last solid consumption: 2100                        Date of last liquid consumption: 01/06/22                        Date of last solid food consumption: 01/05/22    BMI:   Wt Readings from Last 3 Encounters:   01/06/22 218 lb (98.9 kg)   10/07/21 210 lb (95.3 kg)   10/05/21 217 lb 12.8 oz (98.8 kg)     Body mass index is 37.42 kg/m². CBC:   Lab Results   Component Value Date    WBC 12.4 12/13/2017    RBC 3.59 12/13/2017    RBC 4.56 10/22/2015    HGB 10.7 12/13/2017    HCT 31.6 12/13/2017    MCV 87.8 12/13/2017    RDW 12.6 12/13/2017     12/13/2017       CMP:   Lab Results   Component Value Date     11/10/2021    K 3.9 11/10/2021     11/10/2021    CO2 24 11/10/2021    BUN 14 11/10/2021    CREATININE 0.8 11/10/2021    GFRAA >60 11/10/2021    AGRATIO 1.2 11/10/2021    LABGLOM >60 11/10/2021    GLUCOSE 160 11/10/2021    GLUCOSE 118 10/22/2015    PROT 7.4 11/10/2021    PROT 7.2 10/22/2015    CALCIUM 9.4 11/10/2021    BILITOT 0.3 11/10/2021    ALKPHOS 86 11/10/2021    AST 20 11/10/2021    ALT 25 11/10/2021       POC Tests: No results for input(s): POCGLU, POCNA, POCK, POCCL, POCBUN, POCHEMO, POCHCT in the last 72 hours.     Coags:   Lab Results   Component Value Date    PROTIME 10.5 11/29/2017    INR 0.93 11/29/2017    APTT 27.5 11/29/2017       HCG (If Applicable): No results found for: PREGTESTUR, PREGSERUM, HCG, HCGQUANT     ABGs: No results found for: PHART, PO2ART, MFU8FCQ, QHV4RZO, BEART, J0HHLDEM     Type & Screen (If Applicable):  No results found for: LABABO, LABRH    Drug/Infectious Status (If Applicable):  No results found for: HIV, HEPCAB    COVID-19 Screening (If Applicable): No results found for: COVID19        Anesthesia Evaluation  Patient summary reviewed and Nursing notes reviewed  Airway: Mallampati: II  TM distance: >3 FB   Neck ROM: full  Mouth opening: > = 3 FB Dental:          Pulmonary:                              Cardiovascular:  Exercise tolerance: good (>4 METS),   (+) hypertension: moderate,                   Neuro/Psych:               GI/Hepatic/Renal:   (+) GERD: well controlled,           Endo/Other:    (+) : arthritis:., .                 Abdominal:             Vascular: Other Findings:           Anesthesia Plan      general     ASA 2       Induction: intravenous and rapid sequence. MIPS: Postoperative opioids intended, Prophylactic antiemetics administered and Postoperative trial extubation. Anesthetic plan and risks discussed with patient. Plan discussed with CRNA.                   Nigel Wahl MD   1/6/2022

## 2022-01-06 NOTE — BRIEF OP NOTE
Brief Postoperative Note      Patient: Pina Dolan  YOB: 1950  MRN: [id number]    Date of Procedure: 1/6/2022    Pre-Op Diagnosis: R22.42  LEFT ANKLE MASS    Post-Op Diagnosis: Same       Procedure(s):  MASS REMOVAL LEFT ANKLE    Surgeon(s):  Braulio Kerns MD    Assistant:  First Assistant: Candelario Landau Cotterman    Anesthesia: General    Estimated Blood Loss (mL): Minimal    Complications: None    Specimens:   ID Type Source Tests Collected by Time Destination   A : A.  MASS LEFT ANKLE Tissue Tissue SURGICAL PATHOLOGY Braulio Kerns MD 1/6/2022 9304        Implants:  * No implants in log *      Drains:   [REMOVED] NG/OG/NJ/NE Tube Nasogastric 16 fr Right nostril (Removed)       Findings: Same    Electronically signed by Braulio Kerns MD on 1/6/2022 at 5:04 PM

## 2022-01-06 NOTE — TELEPHONE ENCOUNTER
Patient spouse called asking for instructions re: aspirin. Per [address] was told to tell patient 81 mg once a day.

## 2022-01-06 NOTE — ANESTHESIA POSTPROCEDURE EVALUATION
Department of Anesthesiology  Postprocedure Note    Patient: Nanda Abdi  MRN: [id number]  YOB: 1950  Date of evaluation: 1/6/2022  Time:  8:07 AM     Procedure Summary     Date: 01/06/22 Room / Location: [address]    Anesthesia Start: 0715 Anesthesia Stop: 0801    Procedure: MASS REMOVAL LEFT ANKLE (Left ) Diagnosis: (R22.42  LEFT ANKLE MASS)    Surgeons: Izaiah Shaver MD Responsible Provider: Michael Hendrickson MD    Anesthesia Type: Not recorded ASA Status: Not recorded          Anesthesia Type: No value filed. Yaakov Phase I: Yaakov Score: 10    Yaakov Phase II:      Last vitals: Reviewed and per EMR flowsheets.        Anesthesia Post Evaluation    Patient location during evaluation: PACU  Patient participation: complete - patient participated  Level of consciousness: awake and alert  Pain score: 2  Airway patency: patent  Nausea & Vomiting: no vomiting  Complications: no  Cardiovascular status: blood pressure returned to baseline  Respiratory status: acceptable  Hydration status: euvolemic  Multimodal analgesia pain management approach

## 2022-01-07 NOTE — OP NOTE
[facility]                     [facility], [address]                                OPERATIVE REPORT    PATIENT NAME: Bridget Rubin                  :        1950  MED REC NO:   [id number]                          ROOM:  ACCOUNT NO:   [de-identified]                           ADMIT DATE: 2022  PROVIDER:     Portia Powell MD    DATE OF PROCEDURE:  2022    PREOPERATIVE DIAGNOSIS:  Left ankle lateral deep mass/lipoma. POSTOPERATIVE DIAGNOSIS:  Left ankle lateral deep mass/lipoma. OPERATION PERFORMED:  Excision of left ankle deep mass/subfascial  measuring 6 cm. SURGEON:  MD Tiffanie Harris, surgical assistant. ANESTHESIA:  General anesthesia. ESTIMATED BLOOD LOSS:  Minimal.    COMPLICATIONS:  None. TOURNIQUET:  Left upper calf, 250 mmHg. INDICATIONS:  This is a 79-year-old female who presented to our office  with left lateral ankle pain. She was diagnosed with an ankle mass  compatible with lipoma. All risks, benefits, and alternatives were  discussed with the patient including nonoperative treatment. She  elected to proceed with the surgical excision of the left lateral ankle  mass. Given the patient's Body mass index is 37.42 kg/m². added significant challenge to the procedure. It required significant physical and mental effort. It required 60% more time for such procedure. OPERATIVE PROCEDURE:  The patient's left ankle was marked. She received  2 gm Ancef IV preoperatively. The patient was then brought to the  operating room and underwent general anesthesia. A well-padded  tourniquet was placed in the left upper calf. The left lower extremity  was then prepped and draped in a regular sterile routine fashion. A  time-out was called confirming the patient name, site, and procedure. Esmarch was used for exsanguination. Tourniquet was inflated to 250  mmHg.   A lateral incision was made over the lateral ankle over the mass. A careful dissection was performed. We were carefully able to dissect  it down. The mass was found to be deep down to the capsule. We  carefully dissected the mass that was compatible with lipoma. It was  found to be at least 6 cm in size. We totally excised the mass and then  sent for histopathology. At this point, we inspected the area. We  could not find any other mass that again needed to be excised. We then  let the tourniquet down. Hemostasis was secured. We irrigated the  incision copiously with normal saline. We then closed the incision with  a 3-0 Vicryl and the skin with a 4-0 Monocryl. Steri-Strips was then  applied. Dressing was then applied in the form of Xeroform, 4 x 4,  sterile Webril, and a Ace wrap. The patient tolerated the procedure well and was taken to the recovery  in stable condition. POSTOPERATIVE PLAN:  The patient will be discharged home. She is  allowed to be weightbearing as tolerated, but no heavy impact activities  for at least two to three weeks.         Octavia Collado MD    D: 01/06/2022 18:07:48       T: 01/07/2022 1:18:42     [id number]  Job#: [id number]     Doc#: [id number]    CC:

## 2022-01-18 NOTE — TELEPHONE ENCOUNTER
General Question     Subject: PATIENT REQUESTING A CALL BACK TO DISCUSS IF STITCHES ARE DISSOLVABLE . PLEASE ADVISE.    Patient and /or Facility Request: [id number], [address] Number: [phone]

## 2022-01-19 NOTE — TELEPHONE ENCOUNTER
Left voicemail notifying patient sutures are dissolvable. Voicemail allowed on communication release.

## 2022-10-13 ENCOUNTER — HOSPITAL ENCOUNTER (OUTPATIENT)
Dept: MAMMOGRAPHY | Age: 72
Discharge: HOME | End: 2022-10-13
Payer: MEDICARE

## 2022-10-13 VITALS — BODY MASS INDEX: 36.7 KG/M2 | WEIGHT: 215 LBS | HEIGHT: 64 IN

## 2022-10-13 DIAGNOSIS — Z12.31 VISIT FOR SCREENING MAMMOGRAM: ICD-10-CM

## 2022-10-13 PROCEDURE — 77063 BREAST TOMOSYNTHESIS BI: CPT

## 2023-04-03 ENCOUNTER — HOSPITAL ENCOUNTER (OUTPATIENT)
Dept: GENERAL RADIOLOGY | Age: 73
Discharge: HOME | End: 2023-04-03
Payer: MEDICARE

## 2023-04-03 ENCOUNTER — HOSPITAL ENCOUNTER (OUTPATIENT)
Age: 73
Discharge: HOME | End: 2023-04-03
Payer: MEDICARE

## 2023-04-03 ENCOUNTER — HOSPITAL ENCOUNTER (OUTPATIENT)
Dept: MRI IMAGING | Age: 73
Discharge: HOME | End: 2023-04-03
Payer: MEDICARE

## 2023-04-03 DIAGNOSIS — M54.16 LUMBAR RADICULOPATHY: ICD-10-CM

## 2023-04-03 PROCEDURE — 72148 MRI LUMBAR SPINE W/O DYE: CPT

## 2023-04-03 PROCEDURE — 72110 X-RAY EXAM L-2 SPINE 4/>VWS: CPT

## 2023-05-25 ENCOUNTER — HOSPITAL ENCOUNTER (OUTPATIENT)
Dept: CT IMAGING | Age: 73
Discharge: HOME | End: 2023-05-25
Payer: MEDICARE

## 2023-05-25 DIAGNOSIS — M54.16 LUMBAR RADICULOPATHY: ICD-10-CM

## 2023-05-25 PROCEDURE — 72131 CT LUMBAR SPINE W/O DYE: CPT

## 2024-01-11 LAB
APTT: 27.8 SECONDS (ref 23.6–34)
INR BLD: 0.9 (ref 0.8–1.2)
PROTHROMBIN TIME: 11.9 SECONDS (ref 11.7–14.2)

## 2024-01-25 ENCOUNTER — OFFICE VISIT (OUTPATIENT)
Dept: PULMONOLOGY | Age: 74
End: 2024-01-25
Payer: MEDICARE

## 2024-01-25 VITALS
SYSTOLIC BLOOD PRESSURE: 132 MMHG | BODY MASS INDEX: 38.66 KG/M2 | OXYGEN SATURATION: 94 % | HEART RATE: 79 BPM | WEIGHT: 225.2 LBS | DIASTOLIC BLOOD PRESSURE: 72 MMHG

## 2024-01-25 DIAGNOSIS — J96.01 ACUTE RESPIRATORY FAILURE WITH HYPOXIA (HCC): ICD-10-CM

## 2024-01-25 DIAGNOSIS — R60.0 BILATERAL LOWER EXTREMITY EDEMA: ICD-10-CM

## 2024-01-25 DIAGNOSIS — J98.6 DIAPHRAGMATIC PARESIS: ICD-10-CM

## 2024-01-25 DIAGNOSIS — G47.33 OSA (OBSTRUCTIVE SLEEP APNEA): ICD-10-CM

## 2024-01-25 PROCEDURE — 3075F SYST BP GE 130 - 139MM HG: CPT | Performed by: INTERNAL MEDICINE

## 2024-01-25 PROCEDURE — 1123F ACP DISCUSS/DSCN MKR DOCD: CPT | Performed by: INTERNAL MEDICINE

## 2024-01-25 PROCEDURE — 3078F DIAST BP <80 MM HG: CPT | Performed by: INTERNAL MEDICINE

## 2024-01-25 PROCEDURE — 99204 OFFICE O/P NEW MOD 45 MIN: CPT | Performed by: INTERNAL MEDICINE

## 2024-01-25 RX ORDER — GLIMEPIRIDE 2 MG/1
1 TABLET ORAL 2 TIMES DAILY
COMMUNITY

## 2024-01-25 RX ORDER — FUROSEMIDE 20 MG/1
20 TABLET ORAL DAILY
Qty: 14 TABLET | Refills: 0 | Status: SHIPPED | OUTPATIENT
Start: 2024-01-25

## 2024-01-25 NOTE — PROGRESS NOTES
Component Value Date    GLUCOSE 160 (H) 11/10/2021    CALCIUM 9.4 11/10/2021     11/10/2021    K 3.9 11/10/2021    CO2 24 11/10/2021     11/10/2021    BUN 14 11/10/2021    CREATININE 0.8 11/10/2021           ASSESSMENT AND PLAN:     1. Acute respiratory failure with hypoxia (HCC)  Patient developed hypoxic respiratory failure after shoulder surgery on 1/16/2023.  I believe the hypoxia was secondary to bilateral lower lobe atelectasis along with fluid overload.  She also developed left hemidiaphragm paresis after shoulder surgery.  Although I do not think that  by itself explains hypoxia.  Patient also likely has undiagnosed sleep apnea.  For now, continue to wean yourself off oxygen.  It is okay not to wear oxygen as long as saturations are above 90%.  Continue Acapella.    2. 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  Sniff test showed that left hemidiaphragm is hypokinetic.  Hopefully will recover in near future.    3. Bilateral lower extremity edema  Patient has bilateral lower extremity edema.  She states that this happened after hospitalization.  I suspect that she has underlying heart failure with preserved ejection fraction.  I have advised her to start taking Lasix 20 mg daily and weigh herself on a daily basis.  Plan to give Lasix for 2 weeks.  - furosemide (LASIX) 20 MG tablet; Take 1 tablet by mouth daily  Dispense: 14 tablet; Refill: 0    4. GEOVANNY (obstructive sleep apnea)  Patient likely has undiagnosed obstructive sleep apnea.  She likely would need sleep study in near future when she has recovered from shoulder surgery.        Return in about 8 weeks (around 3/21/2024).         Ivone Mendenhall MD  Pulmonary Critical Care and Sleep Medicine  Electronically signed by Ivone Mendenhall MD on 1/25/2024 at 4:06 PM     This note was completed using dragon medical speech recognition software. Grammatical errors, random word insertions, pronoun errors

## (undated) DEVICE — PACK PROCEDURE SURG EXTREMITY MFFOP CUST

## (undated) DEVICE — SOLUTION IRRIG 500ML 0.9% SOD CHL USP POUR PLAS BTL

## (undated) DEVICE — 3M™ STERI-STRIP™ REINFORCED ADHESIVE SKIN CLOSURES, R1547, 1/2 IN X 4 IN (12 MM X 100 MM), 6 STRIPS/ENVELOPE: Brand: 3M™ STERI-STRIP™

## (undated) DEVICE — BANDAGE COMPR W4INXL12FT E DISP ESMARCH EVEN

## (undated) DEVICE — GAUZE,SPONGE,4"X4",8PLY,STRL,LF,10/TRAY: Brand: MEDLINE

## (undated) DEVICE — GLOVE ORANGE PI 8   MSG9080

## (undated) DEVICE — APPLICATOR MEDICATED 26 CC SOLUTION HI LT ORNG CHLORAPREP

## (undated) DEVICE — Z CONVERTED USE 2273164 BANDAGE COMPR W4INXL4 1/2YD E EC SGL LAYERED CLP CLSR ECONO

## (undated) DEVICE — NEEDLE HYPO 22GA L1.5IN BLK POLYPR HUB S STL REG BVL STR

## (undated) DEVICE — MASC TURNOVER KIT: Brand: MEDLINE INDUSTRIES, INC.

## (undated) DEVICE — INTENDED FOR TISSUE SEPARATION, AND OTHER PROCEDURES THAT REQUIRE A SHARP SURGICAL BLADE TO PUNCTURE OR CUT.: Brand: BARD-PARKER ® STAINLESS STEEL BLADES

## (undated) DEVICE — T-DRAPE,EXTREMITY,STERILE: Brand: MEDLINE

## (undated) DEVICE — GLOVE ORTHO 8   MSG9480

## (undated) DEVICE — DRAPE,U/ SHT,SPLIT,PLAS,STERIL: Brand: MEDLINE

## (undated) DEVICE — SUTURE MCRYL + SZ 4-0 L27IN ABSRB UD L19MM PS-2 3/8 CIR MCP426H

## (undated) DEVICE — MASTISOL ADHESIVE LIQ 2/3ML

## (undated) DEVICE — MEDICINE CUP, GRADUATED, STER: Brand: MEDLINE

## (undated) DEVICE — DRESSING,GAUZE,XEROFORM,CURAD,1"X8",ST: Brand: CURAD

## (undated) DEVICE — PADDING CAST W4INXL4YD ST COT RAYON MICROPLEATED HIGHLY

## (undated) DEVICE — SUTURE VCRL + SZ 3-0 L18IN ABSRB UD SH 1/2 CIR TAPERCUT NDL VCP864D